# Patient Record
Sex: FEMALE | Race: WHITE | NOT HISPANIC OR LATINO | Employment: FULL TIME | URBAN - METROPOLITAN AREA
[De-identification: names, ages, dates, MRNs, and addresses within clinical notes are randomized per-mention and may not be internally consistent; named-entity substitution may affect disease eponyms.]

---

## 2018-08-22 ENCOUNTER — OFFICE VISIT (OUTPATIENT)
Dept: PRIMARY CARE CLINIC | Facility: CLINIC | Age: 23
End: 2018-08-22
Attending: FAMILY MEDICINE
Payer: COMMERCIAL

## 2018-08-22 VITALS
HEIGHT: 65 IN | BODY MASS INDEX: 19.91 KG/M2 | HEART RATE: 99 BPM | SYSTOLIC BLOOD PRESSURE: 124 MMHG | DIASTOLIC BLOOD PRESSURE: 80 MMHG | WEIGHT: 119.5 LBS

## 2018-08-22 DIAGNOSIS — G44.011 INTRACTABLE EPISODIC CLUSTER HEADACHE: ICD-10-CM

## 2018-08-22 DIAGNOSIS — E34.9 HORMONE DISORDER: ICD-10-CM

## 2018-08-22 DIAGNOSIS — L70.8 OTHER ACNE: ICD-10-CM

## 2018-08-22 DIAGNOSIS — Z00.00 ANNUAL PHYSICAL EXAM: Primary | ICD-10-CM

## 2018-08-22 PROCEDURE — 99999 PR PBB SHADOW E&M-NEW PATIENT-LVL IV: CPT | Mod: PBBFAC,,, | Performed by: FAMILY MEDICINE

## 2018-08-22 PROCEDURE — 99385 PREV VISIT NEW AGE 18-39: CPT | Mod: S$GLB,,, | Performed by: FAMILY MEDICINE

## 2018-08-22 RX ORDER — SUMATRIPTAN 50 MG/1
TABLET, FILM COATED ORAL
Qty: 60 TABLET | Refills: 2 | Status: SHIPPED | OUTPATIENT
Start: 2018-08-22 | End: 2021-02-10

## 2018-08-22 RX ORDER — DAPSONE 75 MG/G
GEL TOPICAL DAILY
COMMUNITY

## 2018-08-22 RX ORDER — NORTRIPTYLINE HYDROCHLORIDE 25 MG/1
25 CAPSULE ORAL NIGHTLY
COMMUNITY
End: 2018-09-17

## 2018-08-22 RX ORDER — CLINDAMYCIN PHOSPHATE 10 MG/G
GEL TOPICAL 2 TIMES DAILY
COMMUNITY
End: 2020-02-19

## 2018-08-22 RX ORDER — SPIRONOLACTONE 50 MG/1
50 TABLET, FILM COATED ORAL 2 TIMES DAILY
COMMUNITY

## 2018-08-22 NOTE — PROGRESS NOTES
Subjective:       Patient ID: Josephine Lenz is a 23 y.o. female.    Chief Complaint: Establish Care and Migraine    Pt presents today to [facility]. Per pt, she is here today because she has a h/o migraines and has run out of her triptan. On prev med nortriptyline on daily basis. Both meds given by her PCP. Also on spironolactone for acne started recently by derm for acne. Will keep her derm in [place] and does not wish to transfer her care to [facility]. Here for [facility]  Pt also with h/o anxiety and depression never on meds. Since her move feels that her symptoms may have worsened and does want to seek counseling. Has done so in past and it has been helpful. Would like referral today    otw unremark hx.  Healthy exercises and eats at home        Review of Systems   Constitutional: Negative for activity change, appetite change, chills, fatigue and fever.   HENT: Negative for congestion, ear pain, sinus pressure, sore throat and trouble swallowing.    Eyes: Negative for photophobia, pain and visual disturbance.   Respiratory: Negative for apnea, cough, chest tightness, shortness of breath and wheezing.    Cardiovascular: Negative for chest pain, palpitations and leg swelling.   Gastrointestinal: Negative for abdominal distention, abdominal pain, constipation, diarrhea, nausea and vomiting.   Genitourinary: Negative.    Musculoskeletal: Negative for back pain, joint swelling and neck pain.   Skin: Negative.    Neurological: Negative for dizziness, tremors, weakness, numbness and headaches.   Psychiatric/Behavioral: Negative for behavioral problems, decreased concentration and sleep disturbance. The patient is not nervous/anxious.    All other systems reviewed and are negative.      Objective:      Physical Exam   Constitutional: She is oriented to person, place, and time. She appears well-developed and well-nourished.   HENT:   Head: Normocephalic and atraumatic.   Right Ear: External ear normal.   Left Ear: External  ear normal.   Nose: Nose normal.   Mouth/Throat: Oropharynx is clear and moist. No oropharyngeal exudate.   Eyes: EOM are normal. Pupils are equal, round, and reactive to light.   Neck: Normal range of motion. Neck supple. No thyromegaly present.   Cardiovascular: Normal rate, regular rhythm, normal heart sounds and intact distal pulses.   No murmur heard.  Pulmonary/Chest: Effort normal and breath sounds normal. No stridor. She has no wheezes. She has no rales.   Abdominal: Soft. Bowel sounds are normal. She exhibits no distension and no mass. There is no tenderness. There is no rebound and no guarding.   Musculoskeletal: Normal range of motion. She exhibits no edema or tenderness.   Lymphadenopathy:     She has no cervical adenopathy.   Neurological: She is alert and oriented to person, place, and time. She has normal reflexes. She displays normal reflexes. No cranial nerve deficit. She exhibits normal muscle tone. Coordination normal.   Skin: Skin is warm and dry. No erythema.   Psychiatric: She has a normal mood and affect. Her behavior is normal. Judgment and thought content normal.       Assessment:       1. Annual physical exam    2. Hormone disorder        Plan:       Annual PE wnl  Migraines: trial of imitrex instead of rivatriptan. Pt amenable  Continue prophylactic med as well  Derm: suggested that pt obtain potassium levels  All baseline labs should be done based on her symptoms of depression as well as migraines/acne  Pt amenable  Annual physical exam  -     CBC auto differential; Future; Expected date: 08/22/2018  -     Comprehensive metabolic panel; Future; Expected date: 08/22/2018  -     Lipid panel; Future; Expected date: 08/22/2018  -     TSH; Future; Expected date: 08/22/2018    Hormone disorder    Other orders  -     Cancel: Ambulatory Referral to Obstetrics / Gynecology  -     Cancel: Ambulatory Referral to Psychiatry  -     sumatriptan (IMITREX) 50 MG tablet; Take one tab po q 2 hrs 200 mg /24  hrs for migraines  Dispense: 60 tablet; Refill: 2      RTC prn symptoms or annually

## 2018-08-22 NOTE — PATIENT INSTRUCTIONS
Kg Bautista (psychiatrist) [phone] [address]   Gifty Tyler LCSW (therapist) [phone]   Ivette Joshua LCSW (therapist) [phone]   Norma Gonzales LCSW (therapist) [phone]   Jerry Higginbotham [phone] (therapist) [address] [facility]   Kyle Joshua (therapist) [phone] [address] Radha Sinclair (therapist) [phone] [address]   [facility] [phone]   [facility] [facility] [place] [address]

## 2018-08-23 ENCOUNTER — LAB VISIT (OUTPATIENT)
Dept: LAB | Facility: HOSPITAL | Age: 23
End: 2018-08-23
Attending: FAMILY MEDICINE
Payer: COMMERCIAL

## 2018-08-23 DIAGNOSIS — Z00.00 ANNUAL PHYSICAL EXAM: ICD-10-CM

## 2018-08-23 LAB
ALBUMIN SERPL BCP-MCNC: 4.5 G/DL
ALP SERPL-CCNC: 65 U/L
ALT SERPL W/O P-5'-P-CCNC: 15 U/L
ANION GAP SERPL CALC-SCNC: 11 MMOL/L
AST SERPL-CCNC: 21 U/L
BASOPHILS # BLD AUTO: 0.05 K/UL
BASOPHILS NFR BLD: 0.9 %
BILIRUB SERPL-MCNC: 0.7 MG/DL
BUN SERPL-MCNC: 15 MG/DL
CALCIUM SERPL-MCNC: 9.7 MG/DL
CHLORIDE SERPL-SCNC: 103 MMOL/L
CHOLEST SERPL-MCNC: 167 MG/DL
CHOLEST/HDLC SERPL: 2.5 {RATIO}
CO2 SERPL-SCNC: 26 MMOL/L
CREAT SERPL-MCNC: 0.9 MG/DL
DIFFERENTIAL METHOD: NORMAL
EOSINOPHIL # BLD AUTO: 0.1 K/UL
EOSINOPHIL NFR BLD: 1.9 %
ERYTHROCYTE [DISTWIDTH] IN BLOOD BY AUTOMATED COUNT: 12.2 %
EST. GFR  (AFRICAN AMERICAN): >60 ML/MIN/1.73 M^2
EST. GFR  (NON AFRICAN AMERICAN): >60 ML/MIN/1.73 M^2
GLUCOSE SERPL-MCNC: 88 MG/DL
HCT VFR BLD AUTO: 40.4 %
HDLC SERPL-MCNC: 68 MG/DL
HDLC SERPL: 40.7 %
HGB BLD-MCNC: 13.4 G/DL
IMM GRANULOCYTES # BLD AUTO: 0.01 K/UL
IMM GRANULOCYTES NFR BLD AUTO: 0.2 %
LDLC SERPL CALC-MCNC: 89.4 MG/DL
LYMPHOCYTES # BLD AUTO: 2.1 K/UL
LYMPHOCYTES NFR BLD: 37 %
MCH RBC QN AUTO: 30.4 PG
MCHC RBC AUTO-ENTMCNC: 33.2 G/DL
MCV RBC AUTO: 92 FL
MONOCYTES # BLD AUTO: 0.3 K/UL
MONOCYTES NFR BLD: 6 %
NEUTROPHILS # BLD AUTO: 3.1 K/UL
NEUTROPHILS NFR BLD: 54 %
NONHDLC SERPL-MCNC: 99 MG/DL
NRBC BLD-RTO: 0 /100 WBC
PLATELET # BLD AUTO: 253 K/UL
PMV BLD AUTO: 10.8 FL
POTASSIUM SERPL-SCNC: 4.1 MMOL/L
PROT SERPL-MCNC: 7.2 G/DL
RBC # BLD AUTO: 4.41 M/UL
SODIUM SERPL-SCNC: 140 MMOL/L
TRIGL SERPL-MCNC: 48 MG/DL
TSH SERPL DL<=0.005 MIU/L-ACNC: 1.64 UIU/ML
WBC # BLD AUTO: 5.71 K/UL

## 2018-08-23 PROCEDURE — 36415 COLL VENOUS BLD VENIPUNCTURE: CPT | Mod: PN

## 2018-08-23 PROCEDURE — 80053 COMPREHEN METABOLIC PANEL: CPT

## 2018-08-23 PROCEDURE — 80061 LIPID PANEL: CPT

## 2018-08-23 PROCEDURE — 84443 ASSAY THYROID STIM HORMONE: CPT

## 2018-08-23 PROCEDURE — 85025 COMPLETE CBC W/AUTO DIFF WBC: CPT

## 2018-08-28 ENCOUNTER — OFFICE VISIT (OUTPATIENT)
Dept: OBSTETRICS AND GYNECOLOGY | Facility: CLINIC | Age: 23
End: 2018-08-28
Payer: COMMERCIAL

## 2018-08-28 ENCOUNTER — PATIENT MESSAGE (OUTPATIENT)
Dept: OBSTETRICS AND GYNECOLOGY | Facility: CLINIC | Age: 23
End: 2018-08-28

## 2018-08-28 ENCOUNTER — HOSPITAL ENCOUNTER (OUTPATIENT)
Dept: RADIOLOGY | Facility: OTHER | Age: 23
Discharge: HOME | End: 2018-08-28
Attending: OBSTETRICS & GYNECOLOGY
Payer: COMMERCIAL

## 2018-08-28 VITALS
BODY MASS INDEX: 20.2 KG/M2 | WEIGHT: 121.25 LBS | HEIGHT: 65 IN | SYSTOLIC BLOOD PRESSURE: 120 MMHG | DIASTOLIC BLOOD PRESSURE: 70 MMHG

## 2018-08-28 DIAGNOSIS — N93.9 ABNORMAL UTERINE BLEEDING (AUB): Primary | ICD-10-CM

## 2018-08-28 DIAGNOSIS — N92.6 IRREGULAR BLEEDING: Primary | ICD-10-CM

## 2018-08-28 DIAGNOSIS — N92.6 IRREGULAR BLEEDING: ICD-10-CM

## 2018-08-28 LAB
B-HCG UR QL: NEGATIVE
C TRACH DNA SPEC QL NAA+PROBE: NOT DETECTED
CTP QC/QA: YES
N GONORRHOEA DNA SPEC QL NAA+PROBE: NOT DETECTED

## 2018-08-28 PROCEDURE — 99203 OFFICE O/P NEW LOW 30 MIN: CPT | Mod: S$GLB,,, | Performed by: OBSTETRICS & GYNECOLOGY

## 2018-08-28 PROCEDURE — 76830 TRANSVAGINAL US NON-OB: CPT | Mod: TC

## 2018-08-28 PROCEDURE — 88175 CYTOPATH C/V AUTO FLUID REDO: CPT

## 2018-08-28 PROCEDURE — 81025 URINE PREGNANCY TEST: CPT | Mod: S$GLB,,, | Performed by: OBSTETRICS & GYNECOLOGY

## 2018-08-28 PROCEDURE — 3008F BODY MASS INDEX DOCD: CPT | Mod: CPTII,S$GLB,, | Performed by: OBSTETRICS & GYNECOLOGY

## 2018-08-28 PROCEDURE — 99999 PR PBB SHADOW E&M-EST. PATIENT-LVL III: CPT | Mod: PBBFAC,,, | Performed by: OBSTETRICS & GYNECOLOGY

## 2018-08-28 PROCEDURE — 76830 TRANSVAGINAL US NON-OB: CPT | Mod: 26,,, | Performed by: RADIOLOGY

## 2018-08-28 PROCEDURE — 76856 US EXAM PELVIC COMPLETE: CPT | Mod: 26,,, | Performed by: RADIOLOGY

## 2018-08-28 PROCEDURE — 76856 US EXAM PELVIC COMPLETE: CPT | Mod: TC

## 2018-08-28 PROCEDURE — 87491 CHLMYD TRACH DNA AMP PROBE: CPT

## 2018-08-28 RX ORDER — TAZAROTENE 0.5 MG/G
GEL TOPICAL
COMMUNITY

## 2018-08-28 RX ORDER — MEDROXYPROGESTERONE ACETATE 10 MG/1
TABLET ORAL
Qty: 10 TABLET | Refills: 0 | Status: SHIPPED | OUTPATIENT
Start: 2018-08-28 | End: 2020-02-19

## 2018-08-29 ENCOUNTER — PATIENT MESSAGE (OUTPATIENT)
Dept: OBSTETRICS AND GYNECOLOGY | Facility: CLINIC | Age: 23
End: 2018-08-29

## 2018-09-07 ENCOUNTER — PATIENT MESSAGE (OUTPATIENT)
Dept: PRIMARY CARE CLINIC | Facility: CLINIC | Age: 23
End: 2018-09-07

## 2018-09-07 ENCOUNTER — PATIENT MESSAGE (OUTPATIENT)
Dept: OBSTETRICS AND GYNECOLOGY | Facility: CLINIC | Age: 23
End: 2018-09-07

## 2018-09-07 DIAGNOSIS — G44.011 INTRACTABLE EPISODIC CLUSTER HEADACHE: Primary | ICD-10-CM

## 2018-09-15 ENCOUNTER — PATIENT MESSAGE (OUTPATIENT)
Dept: OBSTETRICS AND GYNECOLOGY | Facility: CLINIC | Age: 23
End: 2018-09-15

## 2018-09-17 ENCOUNTER — PATIENT MESSAGE (OUTPATIENT)
Dept: PRIMARY CARE CLINIC | Facility: CLINIC | Age: 23
End: 2018-09-17

## 2018-09-17 ENCOUNTER — OFFICE VISIT (OUTPATIENT)
Dept: OBSTETRICS AND GYNECOLOGY | Facility: CLINIC | Age: 23
End: 2018-09-17
Payer: COMMERCIAL

## 2018-09-17 VITALS
HEIGHT: 65 IN | BODY MASS INDEX: 20.42 KG/M2 | WEIGHT: 122.56 LBS | DIASTOLIC BLOOD PRESSURE: 74 MMHG | SYSTOLIC BLOOD PRESSURE: 112 MMHG

## 2018-09-17 DIAGNOSIS — N93.9 ABNORMAL UTERINE BLEEDING (AUB): Primary | ICD-10-CM

## 2018-09-17 PROCEDURE — 99999 PR PBB SHADOW E&M-EST. PATIENT-LVL III: CPT | Mod: PBBFAC,,, | Performed by: OBSTETRICS & GYNECOLOGY

## 2018-09-17 PROCEDURE — 3008F BODY MASS INDEX DOCD: CPT | Mod: CPTII,S$GLB,, | Performed by: OBSTETRICS & GYNECOLOGY

## 2018-09-17 PROCEDURE — 99213 OFFICE O/P EST LOW 20 MIN: CPT | Mod: S$GLB,,, | Performed by: OBSTETRICS & GYNECOLOGY

## 2018-09-17 RX ORDER — NITROFURANTOIN 25 MG/5ML
SUSPENSION ORAL
COMMUNITY
End: 2020-02-04

## 2018-10-02 ENCOUNTER — IMMUNIZATION (OUTPATIENT)
Dept: INTERNAL MEDICINE | Facility: CLINIC | Age: 23
End: 2018-10-02
Payer: COMMERCIAL

## 2018-10-02 PROCEDURE — 90471 IMMUNIZATION ADMIN: CPT | Mod: S$GLB,,, | Performed by: INTERNAL MEDICINE

## 2018-10-02 PROCEDURE — 90686 IIV4 VACC NO PRSV 0.5 ML IM: CPT | Mod: S$GLB,,, | Performed by: INTERNAL MEDICINE

## 2019-03-09 ENCOUNTER — PATIENT MESSAGE (OUTPATIENT)
Dept: PRIMARY CARE CLINIC | Facility: CLINIC | Age: 24
End: 2019-03-09

## 2019-03-09 DIAGNOSIS — R51.9 PERSISTENT HEADACHES: Primary | ICD-10-CM

## 2019-03-09 DIAGNOSIS — F41.9 ANXIETY: Primary | ICD-10-CM

## 2019-03-12 NOTE — TELEPHONE ENCOUNTER
Deondre, please let pt know that her order is in and please give her the number for psych at [facility].  Thanks,  PV

## 2019-03-29 ENCOUNTER — TELEPHONE (OUTPATIENT)
Dept: INTERNAL MEDICINE | Facility: CLINIC | Age: 24
End: 2019-03-29

## 2020-02-04 ENCOUNTER — OFFICE VISIT (OUTPATIENT)
Dept: PRIMARY CARE CLINIC | Facility: CLINIC | Age: 25
End: 2020-02-04
Attending: FAMILY MEDICINE
Payer: COMMERCIAL

## 2020-02-04 ENCOUNTER — TELEPHONE (OUTPATIENT)
Dept: PRIMARY CARE CLINIC | Facility: CLINIC | Age: 25
End: 2020-02-04

## 2020-02-04 VITALS
HEART RATE: 91 BPM | DIASTOLIC BLOOD PRESSURE: 70 MMHG | BODY MASS INDEX: 20.64 KG/M2 | HEIGHT: 65 IN | OXYGEN SATURATION: 100 % | WEIGHT: 123.88 LBS | SYSTOLIC BLOOD PRESSURE: 114 MMHG

## 2020-02-04 DIAGNOSIS — Z00.00 ANNUAL PHYSICAL EXAM: Primary | ICD-10-CM

## 2020-02-04 DIAGNOSIS — L70.8 OTHER ACNE: ICD-10-CM

## 2020-02-04 DIAGNOSIS — G44.011 INTRACTABLE EPISODIC CLUSTER HEADACHE: ICD-10-CM

## 2020-02-04 DIAGNOSIS — R41.840 ATTENTION AND CONCENTRATION DEFICIT: ICD-10-CM

## 2020-02-04 PROCEDURE — 99395 PREV VISIT EST AGE 18-39: CPT | Mod: 25,S$GLB,, | Performed by: FAMILY MEDICINE

## 2020-02-04 PROCEDURE — 99395 PR PREVENTIVE VISIT,EST,18-39: ICD-10-PCS | Mod: 25,S$GLB,, | Performed by: FAMILY MEDICINE

## 2020-02-04 PROCEDURE — 90686 FLU VACCINE (QUAD) GREATER THAN OR EQUAL TO 3YO PRESERVATIVE FREE IM: ICD-10-PCS | Mod: S$GLB,,, | Performed by: FAMILY MEDICINE

## 2020-02-04 PROCEDURE — 99999 PR PBB SHADOW E&M-EST. PATIENT-LVL III: ICD-10-PCS | Mod: PBBFAC,,, | Performed by: FAMILY MEDICINE

## 2020-02-04 PROCEDURE — 90471 FLU VACCINE (QUAD) GREATER THAN OR EQUAL TO 3YO PRESERVATIVE FREE IM: ICD-10-PCS | Mod: S$GLB,,, | Performed by: FAMILY MEDICINE

## 2020-02-04 PROCEDURE — 90471 IMMUNIZATION ADMIN: CPT | Mod: S$GLB,,, | Performed by: FAMILY MEDICINE

## 2020-02-04 PROCEDURE — 90686 IIV4 VACC NO PRSV 0.5 ML IM: CPT | Mod: S$GLB,,, | Performed by: FAMILY MEDICINE

## 2020-02-04 PROCEDURE — 99999 PR PBB SHADOW E&M-EST. PATIENT-LVL III: CPT | Mod: PBBFAC,,, | Performed by: FAMILY MEDICINE

## 2020-02-04 NOTE — PATIENT INSTRUCTIONS
Josephine,     We are always striving for excellence. Should you receive a patient experience survey electronically or by mail, we would appreciate if you would take a few moments to give us your feedback. These surveys let us know our strengths as well as areas of opportunity for improvement to better serve you.    Thank you for your time,  Palmer Garza MA

## 2020-02-04 NOTE — PROGRESS NOTES
Subjective:       Patient ID: Josephine Lenz is a 24 y.o. female.    Chief Complaint: Annual Exam (evaluation for ADHD, ADD)    Pt presents today for annual. Also here bc she has concerns that she cannot focus, or pay attention to things that she should be able too. Feels that she is not optimizing her potential and afetr speaking with her therapist feels that she needs to seek an assessment for ADHD/ADD.    Has had h/o migraines. Did see neuro. Is having some HA's and will return if they continue to worsen or persist. Does note that the patterns of this HA are different    Did switch jobs, and did get dog, so life has been busy. Pt pt is physically active at work and home          Review of Systems   Constitutional: Negative for activity change, appetite change, chills, fatigue and fever.   HENT: Negative for congestion, ear pain, sinus pressure, sore throat and trouble swallowing.    Eyes: Negative for photophobia, pain and visual disturbance.   Respiratory: Negative for apnea, cough, chest tightness, shortness of breath and wheezing.    Cardiovascular: Negative for chest pain, palpitations and leg swelling.   Gastrointestinal: Negative for abdominal distention, abdominal pain, constipation, diarrhea, nausea and vomiting.   Genitourinary: Negative.    Musculoskeletal: Negative for back pain, joint swelling and neck pain.   Skin: Negative.    Neurological: Positive for headaches. Negative for dizziness, tremors, weakness and numbness.   Psychiatric/Behavioral: Negative for behavioral problems, decreased concentration and sleep disturbance. The patient is not nervous/anxious.         Attention issues   All other systems reviewed and are negative.      Objective:      Physical Exam   Constitutional: She is oriented to person, place, and time. She appears well-developed and well-nourished.   HENT:   Head: Normocephalic and atraumatic.   Right Ear: External ear normal.   Left Ear: External ear normal.   Nose: Nose  normal.   Mouth/Throat: Oropharynx is clear and moist. No oropharyngeal exudate.   Eyes: Pupils are equal, round, and reactive to light. EOM are normal.   Neck: Normal range of motion. Neck supple. No thyromegaly present.   Cardiovascular: Normal rate, regular rhythm, normal heart sounds and intact distal pulses.   No murmur heard.  Pulmonary/Chest: Effort normal and breath sounds normal. No stridor. No respiratory distress. She has no wheezes. She has no rales.   Abdominal: Soft. Bowel sounds are normal. She exhibits no distension and no mass. There is no tenderness. There is no rebound and no guarding.   Musculoskeletal: Normal range of motion. She exhibits no edema or tenderness.   Lymphadenopathy:     She has no cervical adenopathy.   Neurological: She is alert and oriented to person, place, and time. She has normal reflexes. She displays normal reflexes. No cranial nerve deficit or sensory deficit. She exhibits normal muscle tone. Coordination normal.   Skin: Skin is warm and dry. No erythema.   Psychiatric: She has a normal mood and affect. Her behavior is normal. Judgment and thought content normal.       Assessment:       1. Annual physical exam    2. Attention and concentration deficit        Plan:       Annual PE wnl  Migraines: will f/u with neuro    Derm:  followed in [place]  All baseline labs should be done based on her symptoms of ADHD if psych deems it to be necessary. Pt will call to schedule IF NEEDED. Orders are in EPIC  Pt amenable  Annual physical exam  -     CBC auto differential; Future; Expected date: 02/04/2020  -     Comprehensive metabolic panel; Future; Expected date: 02/04/2020  -     Lipid panel; Future; Expected date: 02/04/2020  -     TSH; Future; Expected date: 02/04/2020    Attention and concentration deficit  -     Ambulatory referral/consult to Psychology; Future; Expected date: 02/11/2020  -     Vitamin B12; Future; Expected date: 02/04/2020    Other orders  -     Influenza -  Quadrivalent (PF)      RTC prn symptoms or annually

## 2020-02-04 NOTE — TELEPHONE ENCOUNTER
Patient signed DEBORAH to inquire mr from [facility] ( called Phone: [phone]) unsuccessful    Also [facility] Department of Family and Community Medicine fax# [phone] for STAT [phone]

## 2020-02-04 NOTE — PROGRESS NOTES
"Patient was given vaccine information sheet for the Flu Vaccine. The area of injection was palpated using the acromion process as a landmark. This area was cleaned with alcohol. Using a 25g 1" safety needle, 0.5mL of the vaccine was placed into the left muscle. The injection site was dressed with a bandage. Patient experienced no complications and was discharged in stable condition. Fluarix vaccine Lot: [id number] Exp: 06/30/20  "

## 2020-02-19 ENCOUNTER — OFFICE VISIT (OUTPATIENT)
Dept: OBSTETRICS AND GYNECOLOGY | Facility: CLINIC | Age: 25
End: 2020-02-19
Payer: COMMERCIAL

## 2020-02-19 VITALS
WEIGHT: 124.31 LBS | DIASTOLIC BLOOD PRESSURE: 72 MMHG | BODY MASS INDEX: 20.71 KG/M2 | HEIGHT: 65 IN | SYSTOLIC BLOOD PRESSURE: 112 MMHG

## 2020-02-19 DIAGNOSIS — Z01.419 WELL WOMAN EXAM WITH ROUTINE GYNECOLOGICAL EXAM: Primary | ICD-10-CM

## 2020-02-19 PROCEDURE — 99999 PR PBB SHADOW E&M-EST. PATIENT-LVL III: CPT | Mod: PBBFAC,,, | Performed by: OBSTETRICS & GYNECOLOGY

## 2020-02-19 PROCEDURE — 99999 PR PBB SHADOW E&M-EST. PATIENT-LVL III: ICD-10-PCS | Mod: PBBFAC,,, | Performed by: OBSTETRICS & GYNECOLOGY

## 2020-02-19 PROCEDURE — 99395 PREV VISIT EST AGE 18-39: CPT | Mod: S$GLB,,, | Performed by: OBSTETRICS & GYNECOLOGY

## 2020-02-19 PROCEDURE — 99395 PR PREVENTIVE VISIT,EST,18-39: ICD-10-PCS | Mod: S$GLB,,, | Performed by: OBSTETRICS & GYNECOLOGY

## 2020-02-19 RX ORDER — CLINDAMYCIN PHOSPHATE 10 UG/ML
LOTION TOPICAL
COMMUNITY
Start: 2020-02-08

## 2020-02-19 NOTE — PROGRESS NOTES
"History & Physical  Gynecology      SUBJECTIVE:     Chief Complaint: Well Woman       History of Present Illness:  Annual Exam-Premenopausal  Patient presents for annual exam. The patient has no complaints today. Menstrual cycles are monthly.  The patient is sexually active. GYN screening history: last pap: approximate date 2018 and was normal. The patient participates in regular exercise: yes.  Smoking status:  no    Contraception: iud    FH:  Breast cancer: maternal and paternal grandmother  Colon cancer: neg  Ovarian cancer: neg    Review of patient's allergies indicates:   Allergen Reactions    [place]      Reports "mouth pain" when eating walnuts       Past Medical History:   Diagnosis Date    Acne     Migraines     Urinary tract infection      Past Surgical History:   Procedure Laterality Date    INTRAUTERINE DEVICE INSERTION      MOUTH SURGERY      teeth removal    WISDOM TOOTH EXTRACTION       OB History        0    Para   0    Term   0       0    AB   0    Living   0       SAB   0    TAB   0    Ectopic   0    Multiple   0    Live Births   0               Family History   Problem Relation Age of Onset    Breast cancer Paternal Grandmother         postmeno cancer had 2x    Breast cancer Maternal Grandmother         postmeno cancer    Alzheimer's disease Maternal Grandmother     Basal cell carcinoma Father     Colon cancer Neg Hx     Ovarian cancer Neg Hx      Social History     Tobacco Use    Smoking status: Never Smoker    Smokeless tobacco: Never Used   Substance Use Topics    Alcohol use: Yes     Alcohol/week: 3.0 standard drinks     Types: 1 Glasses of wine, 1 Cans of beer, 1 Shots of liquor per week    Drug use: Yes     Types: Marijuana       Current Outpatient Medications   Medication Sig    clindamycin (CLEOCIN T) 1 % lotion     copper (PARAGARD T 380A) 380 square mm IUD by Intrauterine route.    dapsone (ACZONE) 7.5 % GlwP Apply topically once daily.    spironolactone " (ALDACTONE) 50 MG tablet Take 50 mg by mouth 2 (two) times daily.    sumatriptan (IMITREX) 50 MG tablet Take one tab po q 2 hrs 200 mg /24 hrs for migraines    tazarotene (TAZORAC) 0.05 % gel      No current facility-administered medications for this visit.          Review of Systems:  Review of Systems   Constitutional: Negative for activity change, appetite change and fever.   Respiratory: Negative for shortness of breath.    Cardiovascular: Negative for chest pain.   Gastrointestinal: Negative for abdominal pain, constipation, diarrhea, nausea and vomiting.   Genitourinary: Negative for menorrhagia, menstrual problem, pelvic pain, vaginal bleeding, vaginal discharge and vaginal pain.   Integumentary:  Negative for nipple discharge.   Neurological: Positive for headaches. Negative for numbness.   Breast: Negative for mastodynia and nipple discharge       OBJECTIVE:     Physical Exam:  Physical Exam   Constitutional: She is oriented to person, place, and time. She appears well-developed and well-nourished.   Neck: Normal range of motion. Neck supple. No tracheal deviation present. No thyromegaly present.   Cardiovascular: Normal rate, regular rhythm and normal heart sounds.   Pulmonary/Chest: Effort normal and breath sounds normal. Right breast exhibits no inverted nipple, no mass, no nipple discharge, no skin change and no tenderness. Left breast exhibits no inverted nipple, no mass, no nipple discharge, no skin change and no tenderness. Breasts are symmetrical.   Abdominal: Soft.   Genitourinary: Vagina normal and uterus normal. No labial fusion. There is no rash, tenderness, lesion or injury on the right labia. There is no rash, tenderness, lesion or injury on the left labia. Uterus is not deviated, not enlarged, not fixed and not tender. Cervix exhibits no motion tenderness, no discharge and no friability. Right adnexum displays no mass, no tenderness and no fullness. Left adnexum displays no mass, no  tenderness and no fullness. No erythema, tenderness or bleeding in the vagina. No foreign body in the vagina. No signs of injury around the vagina. No vaginal discharge found.   Genitourinary Comments: Urethra: normal appearing urethra with no masses, tenderness or lesions  Urethral meatus: normal size, anterior vaginal wall with no prolapse, no lesions   Neurological: She is alert and oriented to person, place, and time.   Psychiatric: She has a normal mood and affect. Her behavior is normal. Judgment and thought content normal.   Nursing note and vitals reviewed.    IUD strings palpable  Chaperoned by: Rex    ASSESSMENT:       ICD-10-CM ICD-9-CM    1. Well woman exam with routine gynecological exam Z01.419 V72.31           Plan:      Josephine was seen today for well woman.    Diagnoses and all orders for this visit:    Well woman exam with routine gynecological exam        No orders of the defined types were placed in this encounter.      Well Woman:  - Pap smear up to date  - Birth control: copper IUD- long discussion about mirena vs kyleena; patient has migraine with aura; no personal history of stroke, but mom had a stroke while on michael; tested negative for clotting disorders; patient will review with neurologist and may consider mirena IUD  - GC/CT:n/a  - Mammogram: due age 41  - Smoking cessation: n/a  - Labs: none required   - Vaccines: gardasil completed  - Exercise counseling      Follow up in one year for annual or prn.    Sangeeta Carr

## 2020-07-28 ENCOUNTER — OFFICE VISIT (OUTPATIENT)
Dept: PSYCHIATRY | Facility: CLINIC | Age: 25
End: 2020-07-28
Payer: COMMERCIAL

## 2020-07-28 DIAGNOSIS — F90.2 ATTENTION DEFICIT HYPERACTIVITY DISORDER, COMBINED TYPE: Primary | ICD-10-CM

## 2020-07-28 PROCEDURE — 96130 PR PSYCHOLOGIC TEST EVAL SVCS, 1ST HR: ICD-10-PCS | Mod: 95,,, | Performed by: PSYCHOLOGIST

## 2020-07-28 PROCEDURE — 96137 PR PSYCH/NEUROPSYCH TEST ADMIN/SCORING, 2+ TESTS, EA ADDTL 30 MIN: ICD-10-PCS | Mod: 95,,, | Performed by: PSYCHOLOGIST

## 2020-07-28 PROCEDURE — 96131 PSYCL TST EVAL PHYS/QHP EA: CPT | Mod: 95,,, | Performed by: PSYCHOLOGIST

## 2020-07-28 PROCEDURE — 96130 PSYCL TST EVAL PHYS/QHP 1ST: CPT | Mod: 95,,, | Performed by: PSYCHOLOGIST

## 2020-07-28 PROCEDURE — 96131 PR PSYCHOLOGIC TEST EVAL SVCS, EA ADDTL HR: ICD-10-PCS | Mod: 95,,, | Performed by: PSYCHOLOGIST

## 2020-07-28 PROCEDURE — 96138 PSYCL/NRPSYC TECH 1ST: CPT | Mod: 95,,, | Performed by: PSYCHOLOGIST

## 2020-07-28 PROCEDURE — 96138 PR PSYCH/NEUROPSYCH TEST ADMIN/SCORING, BY TECH, 2+ TESTS, 1ST 30 MIN: ICD-10-PCS | Mod: 95,,, | Performed by: PSYCHOLOGIST

## 2020-07-28 PROCEDURE — 96137 PSYCL/NRPSYC TST PHY/QHP EA: CPT | Mod: 95,,, | Performed by: PSYCHOLOGIST

## 2020-07-28 NOTE — PROGRESS NOTES
"DATE 7/28/20    REFERRAL SOURCE: Dr. Mirza    CHIEF COMPLAINT: poor attention, impulsivity, hyperactivity    LENGTH OF SESSION:50m    MET WITH: Patient served as own historian    SCHOOL AND GRADE: Graduated from [employer] and plans on an [employer]    DIAGNOSTIC IMPRESSION: ADHD- Combined Type    PSYCHOLOGICAL AND MEDICAL CONDITIONS: Has had some anxiety/depression but not an impairment    HIGH RISK FACTORS None    CONTENT OF SESSION: Hx of symptoms of ADHD going back to elementary school, compensations, social impairments and reason for wanting the evaluation now.     THERAPEUTIC STRATAGIES Structured interview    PLAN: PATIENT WILL COMPLETE PSYCHOLOGICAL TESTING. REPORT OF TEST RESULTS WILL FOLLOW AND CAN BE FOUND IN Epic UNDER "NOTES" TAB    ASSESSMENT OF PATIENTS ABILITY TO ADHERE TO TREATMENT PLAN: Above average    PERSON PERFORMING SERVICE: CHAS ASHTON, PH.D      Mental Status Exam:  General appearance:  appears stated age, neatly dressed, well groomed  Speech:  normal rate and tone  Level of cooperation:  cooperative  Thought processes:  logical, goal-directed  Mood:  euthymic  Thought content:  no illusions, no visual hallucinations, no auditory hallucinations, no delusions, no active or passive homicidal thoughts, no active or passive suicidal ideation, no obsessions, no compulsions, no violence  Affect:  appropriate  Orientation:  oriented to person, place, and time  Memory: intact  Attention span and concentration: intact  Fund of general knowledge: appropriate for age  Abstract reasoning:  appears average for age  Judgment and insight: fair  Language:  intact          "

## 2020-07-28 NOTE — PROGRESS NOTES
The patient location is: home  The chief complaint leading to consultation is: ADHD  Visit type: audiovisual    Face to Face time with patient: 50 minutes of total time spent on the encounter, which includes face to face time and non-face to face time preparing to see the patient (eg, review of tests), Obtaining and/or reviewing separately obtained history, Documenting clinical information in the electronic or other health record, Independently interpreting results (not separately reported) and communicating results to the patient/family/caregiver, or Care coordination (not separately reported).         Each patient to whom he or she provides medical services by telemedicine is:  (1) informed of the relationship between the physician and patient and the respective role of any other health care provider with respect to management of the patient; and (2) notified that he or she may decline to receive medical services by telemedicine and may withdraw from such care at any time.    Notes: Long history of ADHD, now wants to change the course

## 2020-08-02 ENCOUNTER — HOSPITAL ENCOUNTER (EMERGENCY)
Facility: OTHER | Age: 25
Discharge: HOME | End: 2020-08-02
Attending: EMERGENCY MEDICINE
Payer: COMMERCIAL

## 2020-08-02 VITALS
SYSTOLIC BLOOD PRESSURE: 133 MMHG | DIASTOLIC BLOOD PRESSURE: 72 MMHG | RESPIRATION RATE: 16 BRPM | HEIGHT: 65 IN | OXYGEN SATURATION: 100 % | BODY MASS INDEX: 19.16 KG/M2 | WEIGHT: 115 LBS | HEART RATE: 77 BPM | TEMPERATURE: 99 F

## 2020-08-02 DIAGNOSIS — T78.2XXA ANAPHYLAXIS, INITIAL ENCOUNTER: Primary | ICD-10-CM

## 2020-08-02 PROCEDURE — 99284 EMERGENCY DEPT VISIT MOD MDM: CPT

## 2020-08-02 PROCEDURE — 25000003 PHARM REV CODE 250: Performed by: EMERGENCY MEDICINE

## 2020-08-02 PROCEDURE — 63600175 PHARM REV CODE 636 W HCPCS: Performed by: EMERGENCY MEDICINE

## 2020-08-02 RX ORDER — FAMOTIDINE 20 MG/1
20 TABLET, FILM COATED ORAL
Status: COMPLETED | OUTPATIENT
Start: 2020-08-02 | End: 2020-08-02

## 2020-08-02 RX ORDER — EPINEPHRINE 0.3 MG/.3ML
1 INJECTION SUBCUTANEOUS
Qty: 2 DEVICE | Refills: 0 | Status: SHIPPED | OUTPATIENT
Start: 2020-08-02 | End: 2021-08-02

## 2020-08-02 RX ORDER — PREDNISONE 20 MG/1
60 TABLET ORAL
Status: COMPLETED | OUTPATIENT
Start: 2020-08-02 | End: 2020-08-02

## 2020-08-02 RX ORDER — PREDNISONE 20 MG/1
60 TABLET ORAL DAILY
Qty: 15 TABLET | Refills: 0 | Status: SHIPPED | OUTPATIENT
Start: 2020-08-02 | End: 2020-08-07

## 2020-08-02 RX ADMIN — FAMOTIDINE 20 MG: 20 TABLET ORAL at 08:08

## 2020-08-02 RX ADMIN — PREDNISONE 60 MG: 20 TABLET ORAL at 08:08

## 2020-08-03 NOTE — ED NOTES
"Pt to ED, reporting she accidentally stepped on ant pile, several bites to feet with redness. Denies being allergic to ant bites. States "some tingling" in the back of her throat PTA but reports none present now. Took 50 mg benadryl PO PTA. Pt AAOx4 and appropriate at this time. Respirations even and unlabored. No acute distress noted. Speech is clear and appropriate, speaking in clear fluent sentences.   "

## 2020-08-03 NOTE — ED PROVIDER NOTES
"Encounter Date: 8/2/2020    SCRIBE #1 NOTE: I, Vivian Lopez , am scribing for, and in the presence of, Dr. Walls .       History     Chief Complaint   Patient presents with    Allergic Reaction     stepped in ant bed appx 1 hour PTA- swelling to hands, feet, eyes, lips, and throat felt "tickly"- took benadryl with improvement. No SOB, tongue swelling- Able to maintain oral secretions     Josephine Lenz is a 25 y.o. female who presents to the ED complaining of an allergic reaction that began after exposure to fire ants 1 hour PTA. Pt has been exposed to fire ants in the past but she has never had a reaction like this. She endorses a rash to her right foot, swelling to her hands, eye, feet, and lips, and a tickling feeling in the back of her throat. Pt denies fever, chills, nausea, vomiting, abdominal pan. He attempted treatment with 50 mg of Benadryl which improved er symptoms.         The history is provided by the patient. No  was used.     Review of patient's allergies indicates:   Allergen Reactions    [place]      Reports "mouth pain" when eating walnuts     Past Medical History:   Diagnosis Date    Acne     Migraines     Urinary tract infection      Past Surgical History:   Procedure Laterality Date    INTRAUTERINE DEVICE INSERTION      MOUTH SURGERY      teeth removal    WISDOM TOOTH EXTRACTION       Family History   Problem Relation Age of Onset    Breast cancer Paternal Grandmother         postmeno cancer had 2x    Breast cancer Maternal Grandmother         postmeno cancer    Alzheimer's disease Maternal Grandmother     Basal cell carcinoma Father     Colon cancer Neg Hx     Ovarian cancer Neg Hx      Social History     Tobacco Use    Smoking status: Never Smoker    Smokeless tobacco: Never Used   Substance Use Topics    Alcohol use: Yes     Alcohol/week: 3.0 standard drinks     Types: 1 Glasses of wine, 1 Cans of beer, 1 Shots of liquor per week    Drug use: Yes     Types: " Marijuana     Review of Systems   Constitutional: Negative for chills and fever.   HENT: Positive for facial swelling. Negative for rhinorrhea, sore throat and trouble swallowing.    Respiratory: Negative for chest tightness, shortness of breath and wheezing.    Cardiovascular: Negative for chest pain, palpitations and leg swelling.   Gastrointestinal: Negative for abdominal pain, diarrhea, nausea and vomiting.   Genitourinary: Negative for dysuria and vaginal bleeding.   Musculoskeletal:        Positive for swelling to the feet and hands.    Skin: Positive for rash.   Neurological: Negative for speech difficulty and light-headedness.   All other systems reviewed and are negative.      Physical Exam     Initial Vitals [08/02/20 2025]   BP Pulse Resp Temp SpO2   115/81 92 16 98.8 °F (37.1 °C) 98 %      MAP       --         Physical Exam    Nursing note and vitals reviewed.  Constitutional: She appears well-developed and well-nourished. She is not diaphoretic. No distress.   HENT:   Head: Normocephalic and atraumatic.   Nose: Nose normal.   Mouth/Throat: Oropharynx is clear and moist.   Eyes: Conjunctivae and EOM are normal. Pupils are equal, round, and reactive to light.   Periorbital edema bilaterally   Neck: Normal range of motion. No tracheal deviation present. No JVD present.   Cardiovascular: Normal rate, regular rhythm, normal heart sounds and intact distal pulses. Exam reveals no gallop and no friction rub.    No murmur heard.  Pulmonary/Chest: Breath sounds normal. No respiratory distress. She has no wheezes. She has no rhonchi. She has no rales. She exhibits no tenderness.   Abdominal: Soft. Bowel sounds are normal. She exhibits no distension and no mass. There is no abdominal tenderness. There is no rebound and no guarding.   Musculoskeletal: Normal range of motion. No tenderness or edema.   Neurological: She is alert and oriented to person, place, and time. She has normal strength and normal reflexes. No  cranial nerve deficit or sensory deficit.   Skin: Skin is warm and dry. Capillary refill takes less than 2 seconds.   Erythema and edema to right foot in area of insect bites   Psychiatric: She has a normal mood and affect. Thought content normal.         ED Course   Procedures  Labs Reviewed - No data to display       Imaging Results    None          Medical Decision Making:   History:   Old Medical Records: I decided to obtain old medical records.  Differential Diagnosis:   Allergic reaction, anaphylaxis, C1 esterase deficiency, cellulitis, anxiety  Clinical Tests:   Lab Tests: Ordered and Reviewed  ED Management:  Discussed with patient that given her history bilateral lower and upper extremity erythema, pain with edema, periorbital edema and sensation scratchy throat with possible change in voice was concerned that she was developing anaphylaxis that she successfully treated with Benadryl prior to arrival.  Given this I will give her a course of steroids as well as an EpiPen since this was a reaction to an insect that is difficult to avoid. After taking into careful account the patient's historical factors, physical exam findings, empirical and objective data obtained from ED workup, the patient appears to be low risk for an emergent medical condition. I feel it is safe and appropriate at this time for the patient to be discharged for follow up and re-evaluation as detailed in the discharge instructions. The patient improved with treatment in the ED and the patient/guardian is comfortable going home. I have discussed the specifics of the workup with the patient/guardian and the patient/guardian has verbalized understanding of the details of the workup, the diagnosis, the treatment plan, and the need for outpatient follow-up.  Although the patient has no emergent etiology today this does not preclude the development of an emergent condition after discharge.  I educated the patient/guardian on the warning signs  and symptoms for which they must seek immediate medical attention. I have advised the patient/guardian that they can return to the ED and/or activate EMS at any time with worsening of their symptoms, change of their symptoms, or with any other medical complaints.  Patient's/guardian understands the ED visit today was primarily to address immediate concerns and to rule out emergent causes of the symptoms. They also understand that these symptoms may require further workup and evaluation as an outpatient. I emphasized the importance of followup.  All questions addressed and patient/guardian were given discharge instructions and followup information.               Scribe Attestation:   Scribe #1: I performed the above scribed service and the documentation accurately describes the services I performed. I attest to the accuracy of the note.    Attending Attestation:           Physician Attestation for Scribe:  Physician Attestation Statement for Scribe #1: I, Dr. Walls, reviewed documentation, as scribed by Vivian Lopez  in my presence, and it is both accurate and complete.                 ED Course as of Aug 03 1328   Sun Aug 02, 2020   2132 Signs and symptoms is significantly improve since they started, noting that she is stable since arrival.  Educated the patient on how to and when to use her EpiPen    [MA]      ED Course User Index  [MA] Segundo Walls MD                Clinical Impression:       ICD-10-CM ICD-9-CM   1. Anaphylaxis, initial encounter  T78.2XXA 995.0             ED Disposition Condition    Discharge Stable        ED Prescriptions     Medication Sig Dispense Start Date End Date Auth. Provider    predniSONE (DELTASONE) 20 MG tablet Take 3 tablets (60 mg total) by mouth once daily. for 5 days 15 tablet 8/2/2020 8/7/2020 Segundo Walls MD    EPINEPHrine (EPIPEN) 0.3 mg/0.3 mL AtIn Inject 0.3 mLs (0.3 mg total) into the muscle as needed. 2 Device 8/2/2020 8/2/2021 Segundo Walls MD        Follow-up  Information     Follow up With Specialties Details Why Contact Info    Christiane Mirza MD Family Medicine Schedule an appointment as soon as possible for a visit in 3 days For follow-up and re-evaluation of allergic reaction [address] [id number] [phone]      [facility] Emergency Medicine  As needed, for any new or worsening symptoms [address] [facility] [address] [phone]                                     Segundo Walls MD  08/03/20 4505     Patent

## 2020-08-10 ENCOUNTER — PATIENT MESSAGE (OUTPATIENT)
Dept: PRIMARY CARE CLINIC | Facility: CLINIC | Age: 25
End: 2020-08-10

## 2020-08-12 ENCOUNTER — OFFICE VISIT (OUTPATIENT)
Dept: PRIMARY CARE CLINIC | Facility: CLINIC | Age: 25
End: 2020-08-12
Attending: FAMILY MEDICINE
Payer: COMMERCIAL

## 2020-08-12 DIAGNOSIS — G44.029 CHRONIC CLUSTER HEADACHE, NOT INTRACTABLE: Primary | ICD-10-CM

## 2020-08-12 PROCEDURE — 99213 PR OFFICE/OUTPT VISIT, EST, LEVL III, 20-29 MIN: ICD-10-PCS | Mod: 95,,, | Performed by: FAMILY MEDICINE

## 2020-08-12 PROCEDURE — 99213 OFFICE O/P EST LOW 20 MIN: CPT | Mod: 95,,, | Performed by: FAMILY MEDICINE

## 2020-08-12 RX ORDER — NORTRIPTYLINE HYDROCHLORIDE 25 MG/1
25 CAPSULE ORAL NIGHTLY
Qty: 90 CAPSULE | Refills: 0 | Status: SHIPPED | OUTPATIENT
Start: 2020-08-12 | End: 2020-11-04

## 2020-08-13 NOTE — PROGRESS NOTES
Subjective:       Patient ID: Josephine Lenz is a 25 y.o. female.    Chief Complaint: No chief complaint on file.    Spoke to pt via telemed. Migraines are worsening and pt wants to know if she can go back to [facility] that she had prior to seeing Dr Carmela Valdovinos at [facility]. Now her insurance [facility] to [facility] so she needs referral to Dr José ferrara stable, no complaints    Review of Systems   Constitutional: Positive for activity change. Negative for unexpected weight change.   HENT: Negative for hearing loss, rhinorrhea and trouble swallowing.    Eyes: Negative for discharge and visual disturbance.   Respiratory: Negative for chest tightness and wheezing.    Cardiovascular: Negative for chest pain and palpitations.   Gastrointestinal: Negative for blood in stool, constipation, diarrhea and vomiting.   Endocrine: Negative for polydipsia and polyuria.   Genitourinary: Negative for difficulty urinating, dysuria, hematuria and menstrual problem.   Musculoskeletal: Negative for arthralgias, joint swelling and neck pain.   Neurological: Positive for headaches. Negative for weakness.   Psychiatric/Behavioral: Positive for confusion. Negative for dysphoric mood.   All other systems reviewed and are negative.        Objective:      Physical Exam  Constitutional:       Appearance: She is well-developed.   HENT:      Head: Normocephalic and atraumatic.   Eyes:      Pupils: Pupils are equal, round, and reactive to light.   Neck:      Musculoskeletal: Neck supple.   Pulmonary:      Effort: Pulmonary effort is normal.   Musculoskeletal: Normal range of motion.   Neurological:      Mental Status: She is oriented to person, place, and time.   Psychiatric:         Behavior: Behavior normal.         Thought Content: Thought content normal.         Judgment: Judgment normal.         Assessment:       1. Chronic cluster headache, not intractable        Plan:       Chronic cluster headache, not intractable  -     Ambulatory  referral/consult to Neurology; Future; Expected date: 08/19/2020    Other orders  -     nortriptyline (PAMELOR) 25 MG capsule; Take 1 capsule (25 mg total) by mouth every evening.  Dispense: 90 capsule; Refill: 0      The patient location is: HOME  The chief complaint leading to consultation is: migraines    Visit type: audiovisual    Face to Face time with patient: 10 mins  15 minutes of total time spent on the encounter, which includes face to face time and non-face to face time preparing to see the patient (eg, review of tests), Obtaining and/or reviewing separately obtained history, Documenting clinical information in the electronic or other health record, Independently interpreting results (not separately reported) and communicating results to the patient/family/caregiver, or Care coordination (not separately reported).         Each patient to whom he or she provides medical services by telemedicine is:  (1) informed of the relationship between the physician and patient and the respective role of any other health care provider with respect to management of the patient; and (2) notified that he or she may decline to receive medical services by telemedicine and may withdraw from such care at any time.    Notes:

## 2020-08-13 NOTE — PROGRESS NOTES
Answers for HPI/ROS submitted by the patient on 8/11/2020   activity change: Yes  unexpected weight change: No  neck pain: No  hearing loss: No  rhinorrhea: No  trouble swallowing: No  eye discharge: No  visual disturbance: No  chest tightness: No  wheezing: No  chest pain: No  palpitations: No  blood in stool: No  constipation: No  vomiting: No  diarrhea: No  polydipsia: No  polyuria: No  difficulty urinating: No  hematuria: No  menstrual problem: No  dysuria: No  joint swelling: No  arthralgias: No  headaches: Yes  weakness: No  confusion: Yes  dysphoric mood: No

## 2020-08-28 ENCOUNTER — TELEPHONE (OUTPATIENT)
Dept: NEUROLOGY | Facility: CLINIC | Age: 25
End: 2020-08-28

## 2020-08-28 NOTE — TELEPHONE ENCOUNTER
----- Message from Kadi Underwood MA sent at 8/14/2020  5:43 PM CDT -----  Willing to wait until October (spoke with pt)

## 2020-09-14 ENCOUNTER — OFFICE VISIT (OUTPATIENT)
Dept: PSYCHIATRY | Facility: CLINIC | Age: 25
End: 2020-09-14
Payer: COMMERCIAL

## 2020-09-14 DIAGNOSIS — F90.2 ATTENTION DEFICIT HYPERACTIVITY DISORDER, COMBINED TYPE: Primary | ICD-10-CM

## 2020-09-14 PROCEDURE — 90834 PR PSYCHOTHERAPY W/PATIENT, 45 MIN: ICD-10-PCS | Mod: 95,,, | Performed by: PSYCHOLOGIST

## 2020-09-14 PROCEDURE — 90834 PSYTX W PT 45 MINUTES: CPT | Mod: 95,,, | Performed by: PSYCHOLOGIST

## 2020-09-14 NOTE — PROGRESS NOTES
Individual Psychotherapy (PhD/LCSW)    9/14/2020    Site:  [facility]         Therapeutic Intervention: Met with patient.  Outpatient - Insight oriented psychotherapy 45 min - CPT code 27731    Chief complaint/reason for encounter: attention deficit     Interval history and content of current session: Feedback given from evaluation. Clearly ADHD-CT. Bright enough to do well in school but her world has been chaotic Plans are for an SHANNON but wants to do things in a more healthy way. In therapy twice a month    Treatment plan:  · Target symptoms: distractability, lack of focus  · Why chosen therapy is appropriate versus another modality: relevant to diagnosis  · Outcome monitoring methods: self-report  · Therapeutic intervention type: insight oriented psychotherapy    Risk parameters:  Patient reports no suicidal ideation  Patient reports no homicidal ideation  Patient reports no self-injurious behavior  Patient reports no violent behavior    Verbal deficits: None    Patient's response to intervention:  The patient's response to intervention is accepting, motivated.    Progress toward goals and other mental status changes:  The patient's progress toward goals is good.    Diagnosis: 314.01  No diagnosis found.    Plan:  individual psychotherapy and consult psychiatrist for medication evaluation    Return to clinic: as scheduled    Length of Service (minutes): 45

## 2020-09-14 NOTE — PROGRESS NOTES
The patient location is: home [place]  The chief complaint leading to consultation is: ADHD    Visit type: audiovisual    Face to Face time with patient: 50 minutes of total time spent on the encounter, which includes face to face time and non-face to face time preparing to see the patient (eg, review of tests), Obtaining and/or reviewing separately obtained history, Documenting clinical information in the electronic or other health record, Independently interpreting results (not separately reported) and communicating results to the patient/family/caregiver, or Care coordination (not separately reported).         Each patient to whom he or she provides medical services by telemedicine is:  (1) informed of the relationship between the physician and patient and the respective role of any other health care provider with respect to management of the patient; and (2) notified that he or she may decline to receive medical services by telemedicine and may withdraw from such care at any time.    Notes: Diagnosis made, follow-up suggestions made

## 2020-09-29 NOTE — PSYCH TESTING
PSYCHOLOGICAL EVALUATION    NAME: Josephine Lenz   MRN: [id number]   : [birth year]   DATE OF EVALUATION:  2020   AGE:  25 years   REFERRED BY:  Self                 REASON FOR REFERRAL:    Josephine sought this evaluation to determine whether or not she had underlying Attention Deficit Hyperactivity Disorder.     EVALUATED BY:  Sanya Alejandra, Ph.D., Clinical Psychologist  Demetria Street, Psychometrician    EVALUATION PROCEDURES AND TIMES:   Conducted by Psychologist:   Clinical Interview    Review of Behavioral and Developmental Questionnaires  Interpretation and report of test data  Integration of information from interviews, medical record, and testing data  Conducted by Psychometrician:  Brown ADD Scales  Rutledges Depression Index-II  Rutledges Anxiety Index  Sentence Completion Form  Behavior Rating Inventory of Executive Functioning-Adult Version  Millon Clinical Multiaxial Inventory-III   Rubén Continuous Performance Test-III    CPT Codes and Units:  96138__1_ 96139_2_ 56889 _N/A_ 30920 _N/A__ 11517    1      96131__2__ Technicians Time _109__ minutes  Psychologist Testing Time _N/A_ minutes   Psychologist Test Evaluation Services _151_ minutes  Computer Administered Test - 23381  Rating Scales - 34885 _N/A___EVALUATION FINDINGS    INTAKE INTERVIEW: Josephine was interviewed by telemedicine on 2020. She sought this evaluation in order to determine whether she had underlying Attention Deficit Hyperactivity Disorder. Josephine graduated from [employer] and is planning on getting an SHANNON. Her tentative plans are to apply for graduate school admission this fall. Currently, Josephine is working as a  and  at [employer] in [place].     In addition to the psychometric tests which will be reported, I did an in-depth assessment of Alex history of symptoms with ADHD as well as other facets of her life. The diagnosis of ADHD is made based upon three different types of  difficulties: hyperactivity, impulsivity as well as attention regulation problems. It was clear that Josephine has significant problems in each of these areas and has had them her whole life. Regarding attention and concentration, Josephine said that she has significant problems in both of these areas: long and short-term projects, cognitively demanding tasks as well as menial tasks. While Josephine has had these difficulties since elementary school, her parents, she said, were very anti-testing and anti-diagnosis. Thus, she essentially had to learn how to cope with them on her own. Processing academic material has never been a difficulty for Josephine. For example, her grade point average at [employer] was a 3.8, and she plans on going to a graduate program in business. Josephine also said that she tests well. In essence, Josephine said that she did not pay attention at all in elementary school but was still able to do well. It wasnt until 6th grade when one teacher finally stopped her from reading a book in class rather than paying attention to the material being presented. Josephine said that most of her problems with attention and concentration came from internal rather than external sources. By this, she meant that she was more susceptible to internal distraction and could tune people out while reading. As she has gotten older, however, Josephine said that she has had more difficulty with distractibility from external sources. Josephine began to notice how different her attention and concentration were when she began to live with roommates. Things that would take her roommates 10-minutes took her 30. Josephine said, I cant go back to school doing the same thing. She is seeking tools to improve her significant problems with attention and concentration.     In addition to the above-mentioned difficulties, Josephine has also had problems with impulsivity. Apparently, this comes out more with regard to social impulsivity. Josephine commented that  she continues to interrupt people while talking. If there is a pause in the conversation, Josephine is quite likely to jump in. These patterns have existed since she was in elementary, and even led to her being bullied by others. Josephine said that in 2nd through 5th grade other students were upset with her because of the things that she said.     Josephine has also had significant patterns of hyperactivity. As she has gotten older these are manifested by restlessness. As a young child she engaged in repetitive behaviors such as pulling at her lip, nails, and she was a cuticle bitter. Doodling has been very important for Josephine because it gives her something to do rather than simply sitting still. Often Josephine feels like a wind-up toy if she is sitting down too long.     One can see, based upon this history, that Josephine has all three diagnostic symptoms of ADHD with regard to attention regulation problems, impulsivity as well as hyperactivity.                                                                                                                                                   FAMILY HISTORY:  Josephine is the oldest of three children in her family. She has a brother who is 22 and sister who is 19. Her father is a  and mother a homemaker. Josephine indicated that her sister has also struggled with ADHD in addition to both anxiety and depression. Thus, there is a family history of ADHD as well as emotional difficulties. It should be noted that her mother, who had mood difficulties, was put on Prozac and had a manic reaction to the introduction of that medication, so it was discontinued right away. Josephine, herself, said she has struggled with both anxiety and depression. Symptoms of depression were experienced by her especially as a high school student. Josephine has had counseling which, she said, has helped with both anxiety and depression. Josephine said that she has learned how to manage both her anxiety and  depression to the point that they are not significant impairments. However, she still struggles with the above-mentioned symptoms of ADHD. Her goal is to develop tools, skills and supports to manage the long-standing history that she has of ADHD. She also said that anxiety seems to be more of an issue for her than depression at this stage of her life.     MEDICAL HISTORY:  Josephine has not had any significant medical problems other than having migraine headaches which she hasnt experienced since age 13. No other significant illnesses, hospitalizations or accidents were reported. She does take medication for her migraines.     In summary, the results of this clinical interview and developmental history indicated that while Josephine has struggled with anxiety and depression in the past, these are not significant impairments currently. However, she has classic symptoms of ADHD-Combined Type which means that not only does she struggle with attention regulation but also hyperactivity and impulsivity. As Josephine said, she is tired of dealing with these symptoms even though she has been quite successful in school. Josephine said, I cant go back to school doing it the same unhealthy way. Long and short-term projects are difficult for her, cognitively demanding tasks are a challenge as well as menial tasks.  Josephine said that she needs to be stressed or even in a panic in order to get these things done otherwise she is at loose ends and tasks take longer to complete. Her level of stress, while motivating, is something which needs to be addressed.     TEST DATA     In addition to the clinical interview which has been outlined, a battery of psychometric tests was done to assess important areas of Alex life. In particular, standardized measures of attention and concentration as well as executive skills were administered by our psychometrician, Ms. Demetria Street, in addition to a battery of tests which would focus on any  difficulties she may be experiencing from an emotional standpoint.     The Rubén Continuous Performance Test-III is a computer administered instrument which provides helpful information on a number of different aspects of attention and concentration including: attention endurance, attention adaptability, vigilance, and control over impulsivity and distractibility. Alex overall profile indicated a moderate likelihood of having a disorder characterized by attention deficits. There were some indications of inattentiveness and also some difficulties with sustained attention. One other characteristic indicated was that her attention regulation was quite inconsistent. Measures of impulsivity were not statistically significant nor were measures of vigilance.     The Brown ADD Scales is a self-report measure of day-to-day manifestations of ADHD. This scale is composed of five different areas which are self-assessed: initiating tasks, attention regulation, application of effort, management of emotions, as well as working/short-term memory. Alex total score was in the ADD highly probable category. Each of these areas will be discussed briefly. In the area of Activation, which refers to initiation of tasks, Josephine has had significant problems. Earlier in this report I reviewed the difficulties that she has had with getting started on projects whether they are cognitive, complex or menial, and her need to have significant stress in order to apply her obviously strong cognitive abilities to getting things done. On the BRANDON Josephine indicated that very often she has trouble starting tasks, bogs down when presented with many things to do, and experiences excessive difficulty in getting started on academic tasks. These she classified as being almost daily.     With regard to attention regulation, Josephine said the following were again, almost daily problems that she has:    - Listens and tries to pay attention but  mind wanders  - Spaces out involuntary and frequently when doing required reading  - Loses track in required reading of what was just read   - Intends to do things but forgets  - Gets lost in daydreaming and preoccupied with own thoughts  - Stares off into space and seems out of it.  - Forgets to bring or loses track of needed items      Both activation and attention regulation areas were rated as being highly significant problems for Josephine.     With regard to application of effort, Alex self-ratings were not as significant as the aforementioned components, but still represented difficulties. Josephine indicated that almost daily she starts tasks but doesnt complete them. Also mentioned was a pattern that Josephine is very slow moving, doesnt jump right into things and tends to be very slow to react or get started on tasks.     While Josephine has definitely improved in the areas of anxiety and depression, she still struggles with some aspects. She noted that she is very sensitive to criticism from others and becomes irritated easily with sudden outbursts of anger. Another observation which Josephine made about herself was that she feels excessively stressed or overwhelmed by tasks that should be manageable.     Working memory was rated as being a significant problem for Josephine. There is some overlap in earlier items such as her tendency to have the intention to do things, but she forgets, or she loses track in required reading of what she has just read and needs to read it again.     Josephine also completed the Adult Version of the Behavior Rating Inventory of Executive Functioning. Executive functioning represents the steering mechanisms that guide intelligence including:  adaptive attention, flexibility in problem solving, self-monitoring, adaptive inhibition of impulses, and the capacity to follow through with intentions despite obstacles and distractions. Executive skills function as the commander in chief of ones  resources by setting priorities, deploying attention, keeping goals in mind despite distractions, managing affect, and organizing time, responsibilities and materials. Two major indices are derived from these scales behavioral/ emotional self-regulation and cognitive.     Looking over all of her scores, it was clear that Josephine has very significant problems in the area of executive functioning. With regard to behavioral self-regulation, her self-ratings regarding problems with the hyperactive-impulsive aspect of ADHD were florid. She indicated the following as happening often to her:    - I tap my fingers or bounce my legs   - I have trouble sitting still  - I have problems waiting my turn  - People say that I am easily distracted  - I rush through things  - I am impulsive    In the area of emotional control, Josephine indicated that often she has angry outbursts and over reacts emotionally. She also described her anger as being intense but ending quickly, and she disclosed that she gets upset quickly or easily over little things.     Mention has already been made about the fact that Josephine starts things at the last minute, and she also said that she needs to be reminded to begin a task even when she is willing. Her working memory score represents a combination of her attention difficulties as well as problems with short-term memory. For example, she said that often she forgets instructions easily, has trouble concentrating on tasks and has trouble with jobs or tasks that have more than one step. Josephine also indicated that she forgets what she is doing in the middle of things.     Planning and organizing are quite challenging for her. Josephine gets overwhelmed by large tasks, has trouble prioritizing activities and also problems organizing activities. Mistake making is common for her. She rated herself as making careless errors when completing tasks and typical doesnt check for those mistakes. Josephine also seems to  misjudge how difficult or easy a task could be and has trouble finishing her work.     Organization of materials is also a problematic area. Josephine indicated she has trouble finding things in her room, closet or desk, doesnt  after herself, leaves her room a mess and loses things that are important to her such as keys, money, or homework.     Josephine were administered some tests to assess her current emotional functioning. On the Rutledges Depression Index-II, is a measure of recent depression, her overall score was very within normal limits. Josephine did indicate that she has had thoughts of killing herself but would not carry them out. The same pattern was noted on the Rutledges Anxiety Index.      Another measure of her emotional functioning was the Millon Clinical Multiaxial Inventory-III, a computer scored measure of clinical personality patterns, severe personality pathology, clinical syndromes and severe clinical syndromes. Under clinical personality patterns, the most prominent was depressive tendencies. There were no prominent patterns under severe personality pathology nor with clinical syndromes or severe clinical syndromes. The results of the MCMI-III indicated that there were no Axis I clinical syndrome patterns currently.     A fourth assessment tool which was administered to Josephine was a Sentence Completion Form (Adult Response Sheet) where she was given the beginning of a sentence and she had to complete it on her own. This is not a quantitative measure of emotional functioning but does provide some insights into her feelings about herself, peer relationships, family of origin, and other important aspects of Alex day-to-day life. She invested herself fully in responding to these items and there were no clinically significant concerns which emerged from her written comments. It does appear that Josephine is much closer to her mother than father, and she does have concerns about some family dynamics,  but I found that her comments were insightful and did not reflect any emotional dynamics about which I was concerned.      In summary, the results of this assessment of Alex emotional functioning indicted that while she has struggled with anxiety and depression in the past, she is doing much better now. 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      DIAGNOSIS:    1. ADHD-Combined Type (DSM V 314.01) (F90.2) The Combined Type of ADHD means that she has difficulties with attention regulation problems as well as hyperactivity and impulsivity.     RECOMMENDATIONS:  1. Given the chronicity of her ADHD symptoms as well as severity, medication should be considered to help her deal with this longstanding problem. It should be noted, however, that her mother had a manic reaction to medication which was discontinued right away. It would be advisable for the medication to be administered/supervised by an adult psychiatrist rather than an internalist.    2. In addition to a pharmacological support, Josephine, herself, would like to learn skills and tools to manage her ADHD. This could be done in a variety of ways. In the first place, she needs to understand more about ADHD and its impact on ones functioning. In this regard, I think Dr. Gil Ferris book called Driven to Distraction would be helpful for her. Dr. Garcia also specializes in anxiety that is often associated with ADHD. She should learn more about his thoughts on this topic, as well. Dr. Garcia also has a newsletter which might be helpful for Josephine to sign up for.  3. Alex executive skills definitely need shoring up. It would be possible for her to have an ADHD  who could provide her with very specific, hands-on strategies to manage her ADHD. Josephine has spoken a lot about how important it is for her to manage herself in  healthier ways. I think she will feel a great deal better if her executive skills could be improved. She would feel that her world is less chaotic which, of course, would lower her stress. Given the severity and chronicity of her ADHD, however, I suspect that simply using a  would not provide the kind of improvement to which she is looking forward.    4. Although Josephine may not need this, she certainly qualifies for testing and classroom accommodations which would reduce the functional limitations imposed on her by having ADHD-Combined Type. In particular, she would qualify for extended time while taking quizzes, tests and exams as well as any standardized test administered to her. In addition, access to an environment with limited distractions would also be a standard accommodation for someone who has ADHD-Combined Type. While completing tests in a standardized time framework may not have been a problem for Josephine in the past, if she begins to check for her careless errors and slows down her pace to control impulsivity, Josephine may begin to need that extra time. When she enters graduate school, she should meet with personnel in the office of services for students with disabilities. That is where she works out her accommodations. Also, this service may be a great resource for getting ADHD coaching.

## 2020-10-01 ENCOUNTER — PATIENT OUTREACH (OUTPATIENT)
Dept: ADMINISTRATIVE | Facility: OTHER | Age: 25
End: 2020-10-01

## 2020-10-02 ENCOUNTER — OFFICE VISIT (OUTPATIENT)
Dept: NEUROLOGY | Facility: CLINIC | Age: 25
End: 2020-10-02
Payer: COMMERCIAL

## 2020-10-02 DIAGNOSIS — G43.109 MIGRAINE WITH AURA AND WITHOUT STATUS MIGRAINOSUS, NOT INTRACTABLE: ICD-10-CM

## 2020-10-02 DIAGNOSIS — G44.029 CHRONIC CLUSTER HEADACHE, NOT INTRACTABLE: ICD-10-CM

## 2020-10-02 PROCEDURE — 99204 PR OFFICE/OUTPT VISIT, NEW, LEVL IV, 45-59 MIN: ICD-10-PCS | Mod: 95,,, | Performed by: PSYCHIATRY & NEUROLOGY

## 2020-10-02 PROCEDURE — 99204 OFFICE O/P NEW MOD 45 MIN: CPT | Mod: 95,,, | Performed by: PSYCHIATRY & NEUROLOGY

## 2020-10-02 RX ORDER — ZOLMITRIPTAN 5 MG/1
1 SPRAY NASAL 2 TIMES DAILY PRN
Qty: 9 EACH | Refills: 12 | Status: SHIPPED | OUTPATIENT
Start: 2020-10-02 | End: 2021-02-10

## 2020-10-02 NOTE — PATIENT INSTRUCTIONS
Preventing Migraine Headaches: Triggers     Red wine is a common migraine trigger.     The first step in preventing migraines is to learn what triggers them. You may then be able to control your triggers to avoid or reduce the severity of your migraines.  Know your triggers  Be aware that you may have more than one trigger, and that some triggers may work together. Common migraine triggers include:  · Food and nutrition. Skipping meals or not drinking enough water can trigger headaches. So can certain foods, such as caffeine, monosodium glutamate (MSG), aged cheese, or sausage.  · Alcohol. Red wine and other alcoholic beverages are common migraine triggers.  · Chemicals. Scents, cleaning products, gasoline, glue, perfume, and paint can be triggers. So can tobacco smoke, including secondhand smoke.  · Emotions. Stress can trigger headaches or make them worse once they begin.  · Sleep disruption. Staying up late, sleeping late, and traveling across time zones can disrupt your sleep cycle, triggering headaches.  · Hormones. Many women notice that migraines tend to happen at a certain point in their menstrual cycle. Birth control pills or hormone replacement therapy may also trigger migraines.  · Environment and weather. Air travel, changes in altitude, air pressure changes, hot sun, or bright or flashing lights can be triggers.  Control your triggers  These are some of the things you can do to try to control triggers:  · Avoid triggers if you can. For example, stay clear of alcohol and foods that trigger your headaches. Use unscented household products. Keep regular sleep habits. Manage stress to help control emotional triggers.  · Change your behavior at times when triggers can't be avoided. For example, make sure to get enough rest and drink plenty of water while you're traveling. Make sure to carry a hat, sunglasses, and your medicines. Be alert for migraine symptoms, so you can treat a migraine early if it  happens.  Date Last Reviewed: 10/9/2015  © 0206-9594 [employer]. [address] [place] [id number], [place] [address]. All rights reserved. This information is not intended as a substitute for professional medical care. Always follow your healthcare professional's instructions.

## 2020-10-02 NOTE — PROGRESS NOTES
Subjective:       Patient ID: Josephine Lenz is a 25 y.o. female.    Chief Complaint: Headache    HPI     Patient seen in consultation at the request of Dr. Mirza for [facility]     Headache history:   Age of onset -  13  Location - R frontal   Nature of pain -  Throbbing   Prodrome - no   Aura -  contralateral Visual wavy grey obscuration, tingling in hands   Duration of headache - > 4 hrs   Time to peak intensity - 30 min   Pain scale - range of intensity - 8-9 /10  Frequency -  4/week to 1/month   Status Migrainosus history - no   Time of day of most headaches- anytime     Associated symptoms with the headache:   Meningeal symptoms - photophobia, phonophobia, exercise intolerance +   Nausea/vomitting  +     Symptoms of increased intracranial pressure: no   Basilar migraine symptoms: no     Headache Triggers:   Stress +   Weather changes +   Recent flare up - related to anaphylaxis or predisone     Other comorbid conditions:  Anxiety - yes   Motion sickness symptom - yes     Treatment history:  Resolution of headache with sleep - yes   Meds tried:  pamelor 25 mg - helps, constip, hair loss   imitrex 50 mg - helps   Marijuana - helps   caffiene - helps   Avoid steroids     Headache risk factors:   H/o TBI  - no   H/o Meningitis  - no   F/h Aneurysms  - no  F/h migraine     Headache burden:   Misses work because of Headache       Review of Systems   Constitutional: Negative.    HENT: Negative.    Eyes: Negative.    Respiratory: Negative.    Cardiovascular: Negative.    Gastrointestinal: Negative.    Endocrine: Negative.    Genitourinary: Negative.    Musculoskeletal: Negative.    Integumentary:  Negative.   Allergic/Immunologic: Negative.    Neurological: Negative.    Hematological: Negative.    Psychiatric/Behavioral: Negative.          Objective:      Physical Exam  Constitutional:       Appearance: Normal appearance.   Neurological:      General: No focal deficit present.      Mental Status: She is alert and oriented to  person, place, and time.   Psychiatric:         Mood and Affect: Mood normal.         Behavior: Behavior normal.         Thought Content: Thought content normal.         Judgment: Judgment normal.         Assessment:           1 Migraine with aura and without status migrainosus, not intractable        Plan:    1, No indication for prophylactic medication. Recommend also taking Magnesium 400 mg PO BID  2, Breakthrough headache - zomig NS  Multiple alternative treatment options were offered to the patient  4. Patient education. Discussed prevention and treatment of migraine headaches. Discussed sleep hygiene, healthy diet, importance of minimizing caffeine intake to a maximum of 100-200 mg /day, importance of avoiding foods with MSG, Nutrasweet, and Aspartame.   Provided hand outs on this.   5. Refrain from over counter pain medications. Discussed medication overuse headache.   6. I urged the patient to keep a headache calender.     Patient verbalized understanding to plan. I answered all her questions to her satisfaction.    The patient location is: Home   The chief complaint leading to consultation is: Headache     Visit type: audiovisual    Face to Face time with patient: 40 min   50  minutes of total time spent on the encounter, which includes face to face time and non-face to face time preparing to see the patient (eg, review of tests), Obtaining and/or reviewing separately obtained history, Documenting clinical information in the electronic or other health record, Independently interpreting results (not separately reported) and communicating results to the patient/family/caregiver, or Care coordination (not separately reported).     Each patient to whom he or she provides medical services by telemedicine is:  (1) informed of the relationship between the physician and patient and the respective role of any other health care provider with respect to management of the patient; and (2) notified that he or she may  decline to receive medical services by telemedicine and may withdraw from such care at any time.

## 2020-10-02 NOTE — LETTER
October 2, 2020      Christiane Mirza MD  [address]   [address]  [facility] [address]           [facility] - Neurology 7th Fl  [address]  [facility] [address]  Phone: [phone]  Fax: [phone]          Patient: Josephine Lenz   MR Number: [id number]   YOB: 1995   Date of Visit: 10/2/2020       Dear Dr. Christiane Mirza:    Thank you for referring Josephine Lenz to me for evaluation. Attached you will find relevant portions of my assessment and plan of care.    If you have questions, please do not hesitate to call me. I look forward to following Josephine Lenz along with you.    Sincerely,    Jan Kumar MD    Enclosure  CC:  No Recipients    If you would like to receive this communication electronically, please contact [email or web address] or [phone] to request more information on [employer] access.    For providers and/or their staff who would like to refer a patient to [facility], please contact us through our one-stop-shop provider referral line, [facility], at [phone].    If you feel you have received this communication in error or would no longer like to receive these types of communications, please e-mail [email or web address]

## 2020-10-03 ENCOUNTER — PATIENT MESSAGE (OUTPATIENT)
Dept: NEUROLOGY | Facility: CLINIC | Age: 25
End: 2020-10-03

## 2020-10-14 ENCOUNTER — OFFICE VISIT (OUTPATIENT)
Dept: PSYCHIATRY | Facility: CLINIC | Age: 25
End: 2020-10-14
Payer: COMMERCIAL

## 2020-10-14 ENCOUNTER — CLINICAL SUPPORT (OUTPATIENT)
Dept: PSYCHIATRY | Facility: CLINIC | Age: 25
End: 2020-10-14
Payer: COMMERCIAL

## 2020-10-14 VITALS
HEIGHT: 65 IN | BODY MASS INDEX: 20.06 KG/M2 | WEIGHT: 120.38 LBS | HEART RATE: 85 BPM | SYSTOLIC BLOOD PRESSURE: 133 MMHG | DIASTOLIC BLOOD PRESSURE: 80 MMHG

## 2020-10-14 DIAGNOSIS — F90.2 ADHD (ATTENTION DEFICIT HYPERACTIVITY DISORDER), COMBINED TYPE: ICD-10-CM

## 2020-10-14 DIAGNOSIS — Z00.00 HEALTHCARE MAINTENANCE: Primary | ICD-10-CM

## 2020-10-14 LAB
AMPHET+METHAMPHET UR QL: NEGATIVE
BARBITURATES UR QL SCN>200 NG/ML: NEGATIVE
BENZODIAZ UR QL SCN>200 NG/ML: NEGATIVE
BZE UR QL SCN: NEGATIVE
CANNABINOIDS UR QL SCN: NEGATIVE
CREAT UR-MCNC: 31 MG/DL (ref 15–325)
ETHANOL UR-MCNC: <10 MG/DL
METHADONE UR QL SCN>300 NG/ML: NEGATIVE
OPIATES UR QL SCN: NEGATIVE
PCP UR QL SCN>25 NG/ML: NEGATIVE
TOXICOLOGY INFORMATION: NORMAL

## 2020-10-14 PROCEDURE — 99203 PR OFFICE/OUTPT VISIT, NEW, LEVL III, 30-44 MIN: ICD-10-PCS | Mod: S$GLB,,, | Performed by: STUDENT IN AN ORGANIZED HEALTH CARE EDUCATION/TRAINING PROGRAM

## 2020-10-14 PROCEDURE — 80307 DRUG TEST PRSMV CHEM ANLYZR: CPT

## 2020-10-14 PROCEDURE — 99999 PR PBB SHADOW E&M-EST. PATIENT-LVL III: ICD-10-PCS | Mod: PBBFAC,,, | Performed by: STUDENT IN AN ORGANIZED HEALTH CARE EDUCATION/TRAINING PROGRAM

## 2020-10-14 PROCEDURE — 99203 OFFICE O/P NEW LOW 30 MIN: CPT | Mod: S$GLB,,, | Performed by: STUDENT IN AN ORGANIZED HEALTH CARE EDUCATION/TRAINING PROGRAM

## 2020-10-14 PROCEDURE — 99999 PR PBB SHADOW E&M-EST. PATIENT-LVL III: CPT | Mod: PBBFAC,,, | Performed by: STUDENT IN AN ORGANIZED HEALTH CARE EDUCATION/TRAINING PROGRAM

## 2020-10-14 RX ORDER — LISDEXAMFETAMINE DIMESYLATE 30 MG/1
30 CAPSULE ORAL EVERY MORNING
Qty: 30 CAPSULE | Refills: 0 | Status: SHIPPED | OUTPATIENT
Start: 2020-10-14 | End: 2020-11-12 | Stop reason: SDUPTHER
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
## 2020-10-15 NOTE — PROGRESS NOTES
Pt seen and discussed with Dr Dick. Female presents with problem of wanting help with ADHD. Has been evaluated by a psychologist. She is a graduate of [employer], but has had a life long problem with attention and more recently, anxiety, which is exacerbated by her attentional issues. Treatment plan at this point includes vyvanse. No prior hx treatment, no h/o drugs, social alcohol. Uses MJ for migraine, and about once a month, recreationally. Advised to cease all recreational MJ use, and to be careful with using alcohol socially. Pending UDS. Agree with treatment plan and follow up.

## 2020-11-12 ENCOUNTER — OFFICE VISIT (OUTPATIENT)
Dept: PSYCHIATRY | Facility: CLINIC | Age: 25
End: 2020-11-12
Payer: COMMERCIAL

## 2020-11-12 VITALS
DIASTOLIC BLOOD PRESSURE: 62 MMHG | HEART RATE: 102 BPM | WEIGHT: 117.19 LBS | SYSTOLIC BLOOD PRESSURE: 145 MMHG | BODY MASS INDEX: 19.5 KG/M2

## 2020-11-12 DIAGNOSIS — F90.2 ADHD (ATTENTION DEFICIT HYPERACTIVITY DISORDER), COMBINED TYPE: Primary | ICD-10-CM

## 2020-11-12 PROCEDURE — 99999 PR PBB SHADOW E&M-EST. PATIENT-LVL III: ICD-10-PCS | Mod: PBBFAC,,, | Performed by: STUDENT IN AN ORGANIZED HEALTH CARE EDUCATION/TRAINING PROGRAM

## 2020-11-12 PROCEDURE — 99213 PR OFFICE/OUTPT VISIT, EST, LEVL III, 20-29 MIN: ICD-10-PCS | Mod: S$GLB,,, | Performed by: STUDENT IN AN ORGANIZED HEALTH CARE EDUCATION/TRAINING PROGRAM

## 2020-11-12 PROCEDURE — 99213 OFFICE O/P EST LOW 20 MIN: CPT | Mod: S$GLB,,, | Performed by: STUDENT IN AN ORGANIZED HEALTH CARE EDUCATION/TRAINING PROGRAM

## 2020-11-12 PROCEDURE — 99999 PR PBB SHADOW E&M-EST. PATIENT-LVL III: CPT | Mod: PBBFAC,,, | Performed by: STUDENT IN AN ORGANIZED HEALTH CARE EDUCATION/TRAINING PROGRAM

## 2020-11-12 RX ORDER — LISDEXAMFETAMINE DIMESYLATE 30 MG/1
30 CAPSULE ORAL EVERY MORNING
Qty: 30 CAPSULE | Refills: 0 | Status: SHIPPED | OUTPATIENT
Start: 2020-11-14 | End: 2020-12-14

## 2020-11-12 RX ORDER — LISDEXAMFETAMINE DIMESYLATE 30 MG/1
30 CAPSULE ORAL EVERY MORNING
Qty: 30 CAPSULE | Refills: 0 | Status: SHIPPED | OUTPATIENT
Start: 2020-12-14 | End: 2021-01-13

## 2020-11-12 RX ORDER — LISDEXAMFETAMINE DIMESYLATE 30 MG/1
30 CAPSULE ORAL EVERY MORNING
Qty: 30 CAPSULE | Refills: 0 | Status: SHIPPED | OUTPATIENT
Start: 2021-01-14 | End: 2021-02-10 | Stop reason: SDUPTHER

## 2020-11-12 NOTE — PROGRESS NOTES
11/12/2020 1:27 PM   Josephine Lenz   1995   [id number]           OUTPATIENT PSYCHIATRY FOLLOW- UP VISIT    Reason for Encounter:  Josephine Lenz, a 25 y.o. female,who presents today for follow up of ADHD.  Met with patient.    Interval History and Content of Current Session:     checked    Today,  Pt reports she has been doing well since last visit. Denies SI/HI/AVH. Med compliant with vyvanse 30mg qD. Has found helpful for concentration and improved organization. Also notes an improvement in anxiety since starting vyvanse. Mood stable. Denies any negative side effects. Takes drug holidays on the weekends. Sleeping and eating well. Denies any alcohol or illicit substance use.  Content with current regimen and would like to continue.  Would prefer to follow up in 3 months.                   Psychiatric Review Of Systems - Is patient experiencing or having changes in:    Complete psychiatric review of systems performed covering symptoms of depression, anxiety, PTSD, kyle, psychosis.    Complete review of systems was negative with the exception of the following positive symptoms:  Please see HPI      Risk Parameters:  Patient reports no suicidal ideation  Patient reports no homicidal ideation  Patient reports no self-injurious behavior  Patient reports no violent behavior    Psychotropic medication review    Current meds-  vyvanse    Compliance: yes    Side effects: None      Review of Systems     Past Medical, Family and Social History: The patient's past medical, family and social history have been reviewed and updated as appropriate within the electronic medical record - see encounter notes.    Medical Review of Symptoms  History obtained from the patient   General : NO chills or fever   Respiratory: NO cough, shortness of breath   Cardiovascular: NO chest pain, palpitations or racing heart   Gastrointestinal: NO nausea, vomiting, constipation or diarrhea   Neurological: NO confusion, dizziness, headaches or  "tremors   Psychiatric: please see HPI     Objective     ALL MEDICATIONS:    Current Outpatient Medications:     clindamycin (CLEOCIN T) 1 % lotion, , Disp: , Rfl:     copper (PARAGARD T 380A) 380 square mm IUD, by Intrauterine route., Disp: , Rfl:     dapsone (ACZONE) 7.5 % GlwP, Apply topically once daily., Disp: , Rfl:     EPINEPHrine (EPIPEN) 0.3 mg/0.3 mL AtIn, Inject 0.3 mLs (0.3 mg total) into the muscle as needed., Disp: 2 Device, Rfl: 0    lisdexamfetamine (VYVANSE) 30 MG capsule, Take 1 capsule (30 mg total) by mouth every morning., Disp: 30 capsule, Rfl: 0    nortriptyline (PAMELOR) 25 MG capsule, TAKE 1 CAPSULE (25 MG TOTAL) BY MOUTH EVERY EVENING., Disp: 90 capsule, Rfl: 0    spironolactone (ALDACTONE) 50 MG tablet, Take 50 mg by mouth 2 (two) times daily., Disp: , Rfl:     sumatriptan (IMITREX) 50 MG tablet, Take one tab po q 2 hrs 200 mg /24 hrs for migraines, Disp: 60 tablet, Rfl: 2    tazarotene (TAZORAC) 0.05 % gel, , Disp: , Rfl:     ZOLMitriptan (ZOMIG) 5 mg Spry, 1 spray by Nasal route 2 (two) times daily as needed., Disp: 9 each, Rfl: 12    ALLERGIES:  Review of patient's allergies indicates:   Allergen Reactions    [place]      Reports "mouth pain" when eating walnuts       RELEVANT LABS/STUDIES:    Lab Results   Component Value Date    WBC 5.71 08/23/2018    HGB 13.4 08/23/2018    HCT 40.4 08/23/2018    MCV 92 08/23/2018     08/23/2018     BMP  Lab Results   Component Value Date     08/23/2018    K 4.1 08/23/2018     08/23/2018    CO2 26 08/23/2018    BUN 15 08/23/2018    CREATININE 0.9 08/23/2018    CALCIUM 9.7 08/23/2018    ANIONGAP 11 08/23/2018    ESTGFRAFRICA >60.0 08/23/2018    EGFRNONAA >60.0 08/23/2018     Lab Results   Component Value Date    ALT 15 08/23/2018    AST 21 08/23/2018    ALKPHOS 65 08/23/2018    BILITOT 0.7 08/23/2018     Lab Results   Component Value Date    TSH 1.639 08/23/2018     No results found for: LABA1C, " HGBA1C    Constitutional  Vitals:     Vitals:    11/12/20 0817   BP: (!) 145/62   Pulse: 102   Weight: 53.1 kg (117 lb 2.8 oz)     HR and BP elevated today. Pt states she was out of breath rushing to clinic as she was rushing late. Instructed to repeat BP and HR at home and monitor. Instructed to notify provider if consistently high. Denies any physical symptoms including chest pain, SOB, COV       PHYSICAL EXAM  General: well developed, well nourished  Neurologic:   Gait: Normal   Psychomotor signs:  No involuntary movements or tremor  AIMS: none    PSYCHIATRIC EXAM:     Mental Status Exam:  Appearance: unremarkable, age appropriate, casually dressed  Behavior/Cooperation: normal, cooperative, eye contact normal  Speech: normal tone, normal rate, normal pitch, normal volume  Language: uses words appropriately; NO aphasia or dysarthria  Mood: euthymic  Affect: reactive, full, appropriate  Thought Process: normal and logical  Thought Content: normal, no suicidality, no homicidality, delusions, or paranoia  Level of Consciousness: Alert and Oriented x3  Memory:  Intact  Attention/concentration: appropriate for age/education.   Fund of Knowledge: appears adequate  Insight:  Intact  Judgment: Intact     Assessment and Diagnosis     General Impression:   ADHD    Intervention/Counseling/Treatment Plan   · Medication Management: -  · Continue Vyvanse 30mg qD     · Labs: reviewed most recent  · The treatment plan and follow up plan were reviewed with the patient.  · Discussed with patient informed consent, risks vs. benefits, alternative treatments, side effect profile and the inherent unpredictability of individual responses to these treatments. The patient expresses understanding of the above and displays the capacity to agree with this current plan and had no other questions.  · Encouraged Patient to keep future appointments.   · Take medications as prescribed and abstain from substance abuse.   · In the event of an  emergency patient was advised to go to the emergency room.    Return to Clinic: 3 months    > than 50% of total time spend on coordination of care and counseling   (which included pts differential diagnosis and prognosis for psychiatric conditions, risks, benefits of treatments, instructions and adherence to treatment plan, risk reduction, reviewing current psychiatric medication regimen, medical problems and social stressors. In addtion to possible discussion with other healthcare provider/s)    Add on Psychotherapy time:0  Total Face time: 15min      Estella Dick MD    [facility]  PGY-3

## 2020-11-12 NOTE — PROGRESS NOTES
Discussed pt presentation and plan with Dr Dick. 24 YO female, with ADHD on vyvanse 30 mg, appears compliant with the medication. If not already done, suggest random UDS to verify pt report. Stable exam.

## 2020-12-23 ENCOUNTER — PATIENT MESSAGE (OUTPATIENT)
Dept: NEUROLOGY | Facility: CLINIC | Age: 25
End: 2020-12-23

## 2020-12-28 ENCOUNTER — LAB VISIT (OUTPATIENT)
Dept: PRIMARY CARE CLINIC | Facility: OTHER | Age: 25
End: 2020-12-28
Attending: INTERNAL MEDICINE
Payer: COMMERCIAL

## 2020-12-28 DIAGNOSIS — Z03.818 ENCOUNTER FOR OBSERVATION FOR SUSPECTED EXPOSURE TO OTHER BIOLOGICAL AGENTS RULED OUT: ICD-10-CM

## 2020-12-28 PROCEDURE — U0003 INFECTIOUS AGENT DETECTION BY NUCLEIC ACID (DNA OR RNA); SEVERE ACUTE RESPIRATORY SYNDROME CORONAVIRUS 2 (SARS-COV-2) (CORONAVIRUS DISEASE [COVID-19]), AMPLIFIED PROBE TECHNIQUE, MAKING USE OF HIGH THROUGHPUT TECHNOLOGIES AS DESCRIBED BY CMS-2020-01-R: HCPCS

## 2020-12-30 LAB — SARS-COV-2 RNA RESP QL NAA+PROBE: NOT DETECTED

## 2021-01-31 ENCOUNTER — PATIENT MESSAGE (OUTPATIENT)
Dept: NEUROLOGY | Facility: CLINIC | Age: 26
End: 2021-01-31

## 2021-02-01 RX ORDER — ELETRIPTAN HYDROBROMIDE 40 MG/1
40 TABLET, FILM COATED ORAL 2 TIMES DAILY PRN
Qty: 9 TABLET | Refills: 12 | Status: SHIPPED | OUTPATIENT
Start: 2021-02-01 | End: 2021-02-10

## 2021-02-02 RX ORDER — NARATRIPTAN 2.5 MG/1
TABLET ORAL
Qty: 9 TABLET | Refills: 12 | Status: SHIPPED | OUTPATIENT
Start: 2021-02-02 | End: 2021-07-13

## 2021-02-10 ENCOUNTER — PATIENT MESSAGE (OUTPATIENT)
Dept: PSYCHIATRY | Facility: CLINIC | Age: 26
End: 2021-02-10

## 2021-02-10 ENCOUNTER — OFFICE VISIT (OUTPATIENT)
Dept: PSYCHIATRY | Facility: CLINIC | Age: 26
End: 2021-02-10
Payer: COMMERCIAL

## 2021-02-10 VITALS
WEIGHT: 112.31 LBS | HEART RATE: 118 BPM | DIASTOLIC BLOOD PRESSURE: 71 MMHG | BODY MASS INDEX: 18.69 KG/M2 | SYSTOLIC BLOOD PRESSURE: 121 MMHG

## 2021-02-10 DIAGNOSIS — F90.2 ADHD (ATTENTION DEFICIT HYPERACTIVITY DISORDER), COMBINED TYPE: Primary | ICD-10-CM

## 2021-02-10 PROCEDURE — 99213 OFFICE O/P EST LOW 20 MIN: CPT | Mod: S$GLB,,, | Performed by: STUDENT IN AN ORGANIZED HEALTH CARE EDUCATION/TRAINING PROGRAM

## 2021-02-10 PROCEDURE — 99999 PR PBB SHADOW E&M-EST. PATIENT-LVL II: CPT | Mod: PBBFAC,,, | Performed by: STUDENT IN AN ORGANIZED HEALTH CARE EDUCATION/TRAINING PROGRAM

## 2021-02-10 PROCEDURE — 99999 PR PBB SHADOW E&M-EST. PATIENT-LVL II: ICD-10-PCS | Mod: PBBFAC,,, | Performed by: STUDENT IN AN ORGANIZED HEALTH CARE EDUCATION/TRAINING PROGRAM

## 2021-02-10 PROCEDURE — 99213 PR OFFICE/OUTPT VISIT, EST, LEVL III, 20-29 MIN: ICD-10-PCS | Mod: S$GLB,,, | Performed by: STUDENT IN AN ORGANIZED HEALTH CARE EDUCATION/TRAINING PROGRAM

## 2021-02-10 RX ORDER — LISDEXAMFETAMINE DIMESYLATE 30 MG/1
30 CAPSULE ORAL EVERY MORNING
Qty: 30 CAPSULE | Refills: 0 | Status: SHIPPED | OUTPATIENT
Start: 2021-04-10 | End: 2021-05-06 | Stop reason: SDUPTHER

## 2021-02-10 RX ORDER — LISDEXAMFETAMINE DIMESYLATE 30 MG/1
30 CAPSULE ORAL EVERY MORNING
Qty: 30 CAPSULE | Refills: 0 | Status: SHIPPED | OUTPATIENT
Start: 2021-03-10 | End: 2021-04-09

## 2021-02-10 RX ORDER — LISDEXAMFETAMINE DIMESYLATE 30 MG/1
30 CAPSULE ORAL EVERY MORNING
Qty: 30 CAPSULE | Refills: 0 | Status: SHIPPED | OUTPATIENT
Start: 2021-02-10 | End: 2021-03-12

## 2021-03-10 ENCOUNTER — OFFICE VISIT (OUTPATIENT)
Dept: PRIMARY CARE CLINIC | Facility: CLINIC | Age: 26
End: 2021-03-10
Attending: FAMILY MEDICINE
Payer: COMMERCIAL

## 2021-03-10 VITALS
BODY MASS INDEX: 19.67 KG/M2 | HEART RATE: 79 BPM | DIASTOLIC BLOOD PRESSURE: 76 MMHG | WEIGHT: 118.06 LBS | SYSTOLIC BLOOD PRESSURE: 108 MMHG | OXYGEN SATURATION: 100 % | HEIGHT: 65 IN

## 2021-03-10 DIAGNOSIS — Z00.00 ANNUAL PHYSICAL EXAM: Primary | ICD-10-CM

## 2021-03-10 DIAGNOSIS — F90.2 ADHD (ATTENTION DEFICIT HYPERACTIVITY DISORDER), COMBINED TYPE: ICD-10-CM

## 2021-03-10 DIAGNOSIS — G43.109 MIGRAINE WITH AURA AND WITHOUT STATUS MIGRAINOSUS, NOT INTRACTABLE: ICD-10-CM

## 2021-03-10 PROCEDURE — 99999 PR PBB SHADOW E&M-EST. PATIENT-LVL III: CPT | Mod: PBBFAC,,, | Performed by: FAMILY MEDICINE

## 2021-03-10 PROCEDURE — 1126F AMNT PAIN NOTED NONE PRSNT: CPT | Mod: S$GLB,,, | Performed by: FAMILY MEDICINE

## 2021-03-10 PROCEDURE — 99999 PR PBB SHADOW E&M-EST. PATIENT-LVL III: ICD-10-PCS | Mod: PBBFAC,,, | Performed by: FAMILY MEDICINE

## 2021-03-10 PROCEDURE — 99395 PR PREVENTIVE VISIT,EST,18-39: ICD-10-PCS | Mod: S$GLB,,, | Performed by: FAMILY MEDICINE

## 2021-03-10 PROCEDURE — 1126F PR PAIN SEVERITY QUANTIFIED, NO PAIN PRESENT: ICD-10-PCS | Mod: S$GLB,,, | Performed by: FAMILY MEDICINE

## 2021-03-10 PROCEDURE — 3008F BODY MASS INDEX DOCD: CPT | Mod: CPTII,S$GLB,, | Performed by: FAMILY MEDICINE

## 2021-03-10 PROCEDURE — 3008F PR BODY MASS INDEX (BMI) DOCUMENTED: ICD-10-PCS | Mod: CPTII,S$GLB,, | Performed by: FAMILY MEDICINE

## 2021-03-10 PROCEDURE — 99395 PREV VISIT EST AGE 18-39: CPT | Mod: S$GLB,,, | Performed by: FAMILY MEDICINE

## 2021-04-16 ENCOUNTER — PATIENT MESSAGE (OUTPATIENT)
Dept: PRIMARY CARE CLINIC | Facility: CLINIC | Age: 26
End: 2021-04-16

## 2021-04-16 DIAGNOSIS — Z12.4 PAP SMEAR FOR CERVICAL CANCER SCREENING: Primary | ICD-10-CM

## 2021-04-23 ENCOUNTER — PATIENT MESSAGE (OUTPATIENT)
Dept: PRIMARY CARE CLINIC | Facility: CLINIC | Age: 26
End: 2021-04-23

## 2021-05-06 ENCOUNTER — OFFICE VISIT (OUTPATIENT)
Dept: PSYCHIATRY | Facility: CLINIC | Age: 26
End: 2021-05-06
Payer: COMMERCIAL

## 2021-05-06 DIAGNOSIS — F90.2 ADHD (ATTENTION DEFICIT HYPERACTIVITY DISORDER), COMBINED TYPE: Primary | ICD-10-CM

## 2021-05-06 PROCEDURE — 99999 PR PBB SHADOW E&M-EST. PATIENT-LVL II: CPT | Mod: PBBFAC,,, | Performed by: STUDENT IN AN ORGANIZED HEALTH CARE EDUCATION/TRAINING PROGRAM

## 2021-05-06 PROCEDURE — 99213 OFFICE O/P EST LOW 20 MIN: CPT | Mod: S$GLB,,, | Performed by: STUDENT IN AN ORGANIZED HEALTH CARE EDUCATION/TRAINING PROGRAM

## 2021-05-06 PROCEDURE — 99999 PR PBB SHADOW E&M-EST. PATIENT-LVL II: ICD-10-PCS | Mod: PBBFAC,,, | Performed by: STUDENT IN AN ORGANIZED HEALTH CARE EDUCATION/TRAINING PROGRAM

## 2021-05-06 PROCEDURE — 99213 PR OFFICE/OUTPT VISIT, EST, LEVL III, 20-29 MIN: ICD-10-PCS | Mod: S$GLB,,, | Performed by: STUDENT IN AN ORGANIZED HEALTH CARE EDUCATION/TRAINING PROGRAM

## 2021-05-06 RX ORDER — LISDEXAMFETAMINE DIMESYLATE 30 MG/1
30 CAPSULE ORAL EVERY MORNING
Qty: 30 CAPSULE | Refills: 0 | Status: SHIPPED | OUTPATIENT
Start: 2021-06-06 | End: 2021-07-06

## 2021-05-06 RX ORDER — LISDEXAMFETAMINE DIMESYLATE 30 MG/1
30 CAPSULE ORAL EVERY MORNING
Qty: 30 CAPSULE | Refills: 0 | Status: SHIPPED | OUTPATIENT
Start: 2021-05-06 | End: 2021-06-05

## 2021-05-06 RX ORDER — LISDEXAMFETAMINE DIMESYLATE 30 MG/1
30 CAPSULE ORAL EVERY MORNING
Qty: 30 CAPSULE | Refills: 0 | Status: SHIPPED | OUTPATIENT
Start: 2021-07-06 | End: 2021-07-23 | Stop reason: SDUPTHER

## 2021-06-29 ENCOUNTER — OFFICE VISIT (OUTPATIENT)
Dept: OBSTETRICS AND GYNECOLOGY | Facility: CLINIC | Age: 26
End: 2021-06-29
Attending: FAMILY MEDICINE
Payer: COMMERCIAL

## 2021-06-29 VITALS
SYSTOLIC BLOOD PRESSURE: 130 MMHG | WEIGHT: 122.13 LBS | HEIGHT: 65 IN | BODY MASS INDEX: 20.35 KG/M2 | DIASTOLIC BLOOD PRESSURE: 80 MMHG

## 2021-06-29 DIAGNOSIS — N94.6 DYSMENORRHEA: ICD-10-CM

## 2021-06-29 DIAGNOSIS — Z01.419 WELL WOMAN EXAM WITH ROUTINE GYNECOLOGICAL EXAM: Primary | ICD-10-CM

## 2021-06-29 DIAGNOSIS — Z97.5 CONTRACEPTION, DEVICE INTRAUTERINE: ICD-10-CM

## 2021-06-29 DIAGNOSIS — Z12.4 PAP SMEAR FOR CERVICAL CANCER SCREENING: ICD-10-CM

## 2021-06-29 PROCEDURE — 99999 PR PBB SHADOW E&M-EST. PATIENT-LVL III: CPT | Mod: PBBFAC,,, | Performed by: OBSTETRICS & GYNECOLOGY

## 2021-06-29 PROCEDURE — 1126F AMNT PAIN NOTED NONE PRSNT: CPT | Mod: S$GLB,,, | Performed by: OBSTETRICS & GYNECOLOGY

## 2021-06-29 PROCEDURE — 99395 PREV VISIT EST AGE 18-39: CPT | Mod: S$GLB,,, | Performed by: OBSTETRICS & GYNECOLOGY

## 2021-06-29 PROCEDURE — 99395 PR PREVENTIVE VISIT,EST,18-39: ICD-10-PCS | Mod: S$GLB,,, | Performed by: OBSTETRICS & GYNECOLOGY

## 2021-06-29 PROCEDURE — 3008F PR BODY MASS INDEX (BMI) DOCUMENTED: ICD-10-PCS | Mod: CPTII,S$GLB,, | Performed by: OBSTETRICS & GYNECOLOGY

## 2021-06-29 PROCEDURE — 3008F BODY MASS INDEX DOCD: CPT | Mod: CPTII,S$GLB,, | Performed by: OBSTETRICS & GYNECOLOGY

## 2021-06-29 PROCEDURE — 99999 PR PBB SHADOW E&M-EST. PATIENT-LVL III: ICD-10-PCS | Mod: PBBFAC,,, | Performed by: OBSTETRICS & GYNECOLOGY

## 2021-06-29 PROCEDURE — 88142 CYTOPATH C/V THIN LAYER: CPT | Performed by: OBSTETRICS & GYNECOLOGY

## 2021-06-29 PROCEDURE — 1126F PR PAIN SEVERITY QUANTIFIED, NO PAIN PRESENT: ICD-10-PCS | Mod: S$GLB,,, | Performed by: OBSTETRICS & GYNECOLOGY

## 2021-07-01 ENCOUNTER — PATIENT MESSAGE (OUTPATIENT)
Dept: PRIMARY CARE CLINIC | Facility: CLINIC | Age: 26
End: 2021-07-01

## 2021-07-03 LAB
FINAL PATHOLOGIC DIAGNOSIS: NORMAL
Lab: NORMAL

## 2021-07-13 ENCOUNTER — OFFICE VISIT (OUTPATIENT)
Dept: PRIMARY CARE CLINIC | Facility: CLINIC | Age: 26
End: 2021-07-13
Attending: FAMILY MEDICINE
Payer: COMMERCIAL

## 2021-07-13 VITALS
BODY MASS INDEX: 19.56 KG/M2 | HEIGHT: 65 IN | DIASTOLIC BLOOD PRESSURE: 72 MMHG | SYSTOLIC BLOOD PRESSURE: 124 MMHG | OXYGEN SATURATION: 100 % | HEART RATE: 95 BPM | WEIGHT: 117.38 LBS

## 2021-07-13 DIAGNOSIS — Z00.00 ANNUAL PHYSICAL EXAM: Primary | ICD-10-CM

## 2021-07-13 DIAGNOSIS — Z23 NEED FOR VACCINATION AGAINST HEPATITIS A: ICD-10-CM

## 2021-07-13 PROCEDURE — 3008F PR BODY MASS INDEX (BMI) DOCUMENTED: ICD-10-PCS | Mod: CPTII,S$GLB,, | Performed by: FAMILY MEDICINE

## 2021-07-13 PROCEDURE — 90632 HEPATITIS A VACCINE ADULT IM: ICD-10-PCS | Mod: S$GLB,,, | Performed by: FAMILY MEDICINE

## 2021-07-13 PROCEDURE — 1126F AMNT PAIN NOTED NONE PRSNT: CPT | Mod: S$GLB,,, | Performed by: FAMILY MEDICINE

## 2021-07-13 PROCEDURE — 90471 HEPATITIS A VACCINE ADULT IM: ICD-10-PCS | Mod: S$GLB,,, | Performed by: FAMILY MEDICINE

## 2021-07-13 PROCEDURE — 3008F BODY MASS INDEX DOCD: CPT | Mod: CPTII,S$GLB,, | Performed by: FAMILY MEDICINE

## 2021-07-13 PROCEDURE — 99999 PR PBB SHADOW E&M-EST. PATIENT-LVL III: CPT | Mod: PBBFAC,,, | Performed by: FAMILY MEDICINE

## 2021-07-13 PROCEDURE — 99395 PR PREVENTIVE VISIT,EST,18-39: ICD-10-PCS | Mod: 25,S$GLB,, | Performed by: FAMILY MEDICINE

## 2021-07-13 PROCEDURE — 90471 IMMUNIZATION ADMIN: CPT | Mod: S$GLB,,, | Performed by: FAMILY MEDICINE

## 2021-07-13 PROCEDURE — 90632 HEPA VACCINE ADULT IM: CPT | Mod: S$GLB,,, | Performed by: FAMILY MEDICINE

## 2021-07-13 PROCEDURE — 99999 PR PBB SHADOW E&M-EST. PATIENT-LVL III: ICD-10-PCS | Mod: PBBFAC,,, | Performed by: FAMILY MEDICINE

## 2021-07-13 PROCEDURE — 99395 PREV VISIT EST AGE 18-39: CPT | Mod: 25,S$GLB,, | Performed by: FAMILY MEDICINE

## 2021-07-13 PROCEDURE — 1126F PR PAIN SEVERITY QUANTIFIED, NO PAIN PRESENT: ICD-10-PCS | Mod: S$GLB,,, | Performed by: FAMILY MEDICINE

## 2021-07-23 ENCOUNTER — OFFICE VISIT (OUTPATIENT)
Dept: PSYCHIATRY | Facility: CLINIC | Age: 26
End: 2021-07-23
Payer: COMMERCIAL

## 2021-07-23 ENCOUNTER — PATIENT MESSAGE (OUTPATIENT)
Dept: NEUROLOGY | Facility: CLINIC | Age: 26
End: 2021-07-23

## 2021-07-23 ENCOUNTER — PROCEDURE VISIT (OUTPATIENT)
Dept: OBSTETRICS AND GYNECOLOGY | Facility: CLINIC | Age: 26
End: 2021-07-23
Payer: COMMERCIAL

## 2021-07-23 VITALS
SYSTOLIC BLOOD PRESSURE: 124 MMHG | HEIGHT: 65 IN | DIASTOLIC BLOOD PRESSURE: 62 MMHG | BODY MASS INDEX: 19.62 KG/M2 | WEIGHT: 117.75 LBS

## 2021-07-23 DIAGNOSIS — F90.2 ADHD (ATTENTION DEFICIT HYPERACTIVITY DISORDER), COMBINED TYPE: Primary | ICD-10-CM

## 2021-07-23 DIAGNOSIS — Z97.5 CONTRACEPTION, DEVICE INTRAUTERINE: Primary | ICD-10-CM

## 2021-07-23 LAB
B-HCG UR QL: NEGATIVE
CTP QC/QA: YES

## 2021-07-23 PROCEDURE — 81025 URINE PREGNANCY TEST: CPT | Mod: S$GLB,,, | Performed by: OBSTETRICS & GYNECOLOGY

## 2021-07-23 PROCEDURE — 58300 INSERTION OF IUD: ICD-10-PCS | Mod: S$GLB,,, | Performed by: OBSTETRICS & GYNECOLOGY

## 2021-07-23 PROCEDURE — 58301 REMOVAL OF IUD: ICD-10-PCS | Mod: S$GLB,,, | Performed by: OBSTETRICS & GYNECOLOGY

## 2021-07-23 PROCEDURE — 99214 PR OFFICE/OUTPT VISIT, EST, LEVL IV, 30-39 MIN: ICD-10-PCS | Mod: 95,,, | Performed by: NURSE PRACTITIONER

## 2021-07-23 PROCEDURE — 99214 OFFICE O/P EST MOD 30 MIN: CPT | Mod: 95,,, | Performed by: NURSE PRACTITIONER

## 2021-07-23 PROCEDURE — 81025 POCT URINE PREGNANCY: ICD-10-PCS | Mod: S$GLB,,, | Performed by: OBSTETRICS & GYNECOLOGY

## 2021-07-23 PROCEDURE — 58300 INSERT INTRAUTERINE DEVICE: CPT | Mod: S$GLB,,, | Performed by: OBSTETRICS & GYNECOLOGY

## 2021-07-23 PROCEDURE — 58301 REMOVE INTRAUTERINE DEVICE: CPT | Mod: S$GLB,,, | Performed by: OBSTETRICS & GYNECOLOGY

## 2021-07-23 RX ORDER — LISDEXAMFETAMINE DIMESYLATE 30 MG/1
30 CAPSULE ORAL EVERY MORNING
Qty: 90 CAPSULE | Refills: 0 | Status: SHIPPED | OUTPATIENT
Start: 2021-07-23 | End: 2021-08-22

## 2021-07-25 ENCOUNTER — PATIENT MESSAGE (OUTPATIENT)
Dept: PSYCHIATRY | Facility: CLINIC | Age: 26
End: 2021-07-25

## 2021-08-15 ENCOUNTER — PATIENT MESSAGE (OUTPATIENT)
Dept: PSYCHIATRY | Facility: CLINIC | Age: 26
End: 2021-08-15

## 2021-08-15 ENCOUNTER — PATIENT MESSAGE (OUTPATIENT)
Dept: NEUROLOGY | Facility: CLINIC | Age: 26
End: 2021-08-15

## 2021-08-23 ENCOUNTER — PATIENT MESSAGE (OUTPATIENT)
Dept: PSYCHIATRY | Facility: CLINIC | Age: 26
End: 2021-08-23